# Patient Record
Sex: FEMALE | Race: WHITE | NOT HISPANIC OR LATINO | Employment: OTHER | URBAN - METROPOLITAN AREA
[De-identification: names, ages, dates, MRNs, and addresses within clinical notes are randomized per-mention and may not be internally consistent; named-entity substitution may affect disease eponyms.]

---

## 2017-06-15 ENCOUNTER — OFFICE VISIT (OUTPATIENT)
Dept: FAMILY MEDICINE | Facility: CLINIC | Age: 57
End: 2017-06-15
Payer: COMMERCIAL

## 2017-06-15 VITALS
HEIGHT: 65 IN | OXYGEN SATURATION: 99 % | DIASTOLIC BLOOD PRESSURE: 82 MMHG | TEMPERATURE: 98.9 F | BODY MASS INDEX: 17.99 KG/M2 | WEIGHT: 108 LBS | SYSTOLIC BLOOD PRESSURE: 120 MMHG | HEART RATE: 81 BPM

## 2017-06-15 DIAGNOSIS — N89.8 VAGINAL DRYNESS: ICD-10-CM

## 2017-06-15 DIAGNOSIS — R06.2 WHEEZING: ICD-10-CM

## 2017-06-15 DIAGNOSIS — M26.609 TMJ (TEMPOROMANDIBULAR JOINT SYNDROME): ICD-10-CM

## 2017-06-15 DIAGNOSIS — F51.01 PRIMARY INSOMNIA: ICD-10-CM

## 2017-06-15 DIAGNOSIS — Z91.09 HOUSE DUST MITE ALLERGY: Primary | ICD-10-CM

## 2017-06-15 DIAGNOSIS — J06.9 UPPER RESPIRATORY TRACT INFECTION, UNSPECIFIED TYPE: ICD-10-CM

## 2017-06-15 DIAGNOSIS — J30.89 NON-SEASONAL ALLERGIC RHINITIS DUE TO OTHER ALLERGIC TRIGGER: ICD-10-CM

## 2017-06-15 DIAGNOSIS — H91.93 DECREASED HEARING, BILATERAL: ICD-10-CM

## 2017-06-15 DIAGNOSIS — B00.1 RECURRENT HERPES LABIALIS: ICD-10-CM

## 2017-06-15 PROCEDURE — 99214 OFFICE O/P EST MOD 30 MIN: CPT | Performed by: PHYSICIAN ASSISTANT

## 2017-06-15 RX ORDER — CETIRIZINE HYDROCHLORIDE 10 MG/1
10 TABLET ORAL DAILY
Qty: 90 TABLET | Refills: 11 | Status: SHIPPED | OUTPATIENT
Start: 2017-06-15 | End: 2018-06-19

## 2017-06-15 RX ORDER — VALACYCLOVIR HYDROCHLORIDE 1 G/1
2000 TABLET, FILM COATED ORAL 2 TIMES DAILY
Qty: 20 TABLET | Refills: 6 | Status: SHIPPED | OUTPATIENT
Start: 2017-06-15 | End: 2018-06-19

## 2017-06-15 RX ORDER — FLUTICASONE PROPIONATE 50 MCG
2 SPRAY, SUSPENSION (ML) NASAL DAILY
Qty: 1 BOTTLE | Refills: 11 | Status: SHIPPED | OUTPATIENT
Start: 2017-06-15 | End: 2018-06-19

## 2017-06-15 RX ORDER — AMOXICILLIN 875 MG
875 TABLET ORAL 2 TIMES DAILY
Qty: 20 TABLET | Refills: 0 | Status: SHIPPED | OUTPATIENT
Start: 2017-06-15 | End: 2018-06-19

## 2017-06-15 RX ORDER — ESTRADIOL 0.1 MG/G
2 CREAM VAGINAL
Qty: 42.5 G | Refills: 3 | Status: SHIPPED | OUTPATIENT
Start: 2017-06-15 | End: 2018-06-19

## 2017-06-15 RX ORDER — GUAIFENESIN AND DEXTROMETHORPHAN HYDROBROMIDE 1200; 60 MG/1; MG/1
1 TABLET, EXTENDED RELEASE ORAL 2 TIMES DAILY PRN
Qty: 60 TABLET | Refills: 11 | Status: SHIPPED | OUTPATIENT
Start: 2017-06-15 | End: 2018-06-19

## 2017-06-15 RX ORDER — ZOLPIDEM TARTRATE 5 MG/1
5 TABLET ORAL
Qty: 30 TABLET | Refills: 0 | Status: SHIPPED | OUTPATIENT
Start: 2017-06-15 | End: 2018-06-19

## 2017-06-15 RX ORDER — CYCLOBENZAPRINE HCL 10 MG
10 TABLET ORAL 3 TIMES DAILY PRN
Qty: 30 TABLET | Refills: 3 | Status: SHIPPED | OUTPATIENT
Start: 2017-06-15 | End: 2018-06-19

## 2017-06-15 RX ORDER — ALBUTEROL SULFATE 90 UG/1
2 AEROSOL, METERED RESPIRATORY (INHALATION) EVERY 6 HOURS PRN
Qty: 1 INHALER | Refills: 0 | Status: SHIPPED | OUTPATIENT
Start: 2017-06-15 | End: 2018-06-19

## 2017-06-15 NOTE — PROGRESS NOTES
"  SUBJECTIVE:                                                    Gabriela Feliciano is a 57 year old female who presents to clinic today for the following health issues:      NEW PATIENT TO ME:      ENT Symptoms             Symptoms: cc Present Absent Comment   Fever/Chills   x    Fatigue   x    Muscle Aches   x    Eye Irritation   x    Sneezing   x    Nasal Jose/Drg  x     Sinus Pressure/Pain  x     Loss of smell   x    Dental pain   x    Sore Throat   x    Swollen Glands   x    Ear Pain/Fullness  x  Both ears feels clogged   Cough  x     Wheeze   x    Chest Pain   x    Shortness of breath   x    Rash   x    Other   x      Symptom duration:  x 1-2 weeks   Symptom severity:  mild to moderate   Treatments tried:  none   Contacts:  none       June 1st after cruising had a sore throat and cough.  Has trouble hearing now.  Feels like she is \"in a bubble\". Is concerned if her balance gets worse she will fall, no balance troubles yet she is just worrying about the future she states. No vertigo.  Sometimes productive cough.  Not sure of color. Not sure if she has nasal discharge or not.  Ringing off and on in her ear, mild. High pitched.   Gets seasonal allergies and dust mite allergies.   No pain.   Does have history of TMJ and anxiety.   Temp is normal today. No known fevers.   Has been using neti pot daily.    No h/o asthma or inhaler use.   Has taken aleve D but not in past two days. Helped temporarily but then her ear symptoms just come back.    Already uses flonase daily for allergies and neti pot.     Nonsmoker.       Also would like all her medications refilled today if possible.       Flexeril-uses PRN TMJ, no side effects. Works well.      ambien 5 mg--takes PRN insomnia. No side effects. Is aware of risks. Will get further refills from PCP.       Valtrex-takes as needed for outbreaks.          Estrace-for vaginal dryness. Works. No side effects.              Mood has been okay, Denies suicidal or homicidal " thoughts.  Patient instructed to go to the emergency room or call 911 if these occur.        Problem list and histories reviewed & adjusted, as indicated.  Additional history: as documented    Patient Active Problem List   Diagnosis     House dust mite allergy     GERD (gastroesophageal reflux disease)     TMJ (temporomandibular joint syndrome)     Diagnostic skin and sensitization tests     Recurrent herpes labialis     Hyperlipidemia LDL goal <160     Post-menopausal     Family history of breast cancer in first degree relative     Generalized anxiety disorder     Advanced directives, counseling/discussion     10 year risk of MI or stroke < 7.5%     Past Surgical History:   Procedure Laterality Date     NO HISTORY OF SURGERY         Social History   Substance Use Topics     Smoking status: Never Smoker     Smokeless tobacco: Never Used     Alcohol use Yes      Comment: wine every 1 weeks     Family History   Problem Relation Age of Onset     Hypertension Mother      CEREBROVASCULAR DISEASE Mother      HEART DISEASE Mother       of MI age 78     Unknown/Adopted Maternal Grandmother      Circulatory Maternal Grandfather      hrafening of arteries/leg amputated     Unknown/Adopted Paternal Grandmother      Unknown/Adopted Paternal Grandfather      Obesity Brother      CEREBROVASCULAR DISEASE Maternal Aunt      CEREBROVASCULAR DISEASE Maternal Aunt      Breast Cancer Maternal Aunt          Current Outpatient Prescriptions   Medication Sig Dispense Refill     fluticasone (FLONASE) 50 MCG/ACT spray Spray 2 sprays into both nostrils daily 1 Bottle 11     cetirizine (ZYRTEC) 10 MG tablet Take 1 tablet (10 mg) by mouth daily 90 tablet 11     cyclobenzaprine (FLEXERIL) 10 MG tablet Take 1 tablet (10 mg) by mouth 3 times daily as needed for muscle spasms 30 tablet 3     zolpidem (AMBIEN) 5 MG tablet Take 1 tablet (5 mg) by mouth nightly as needed for sleep 30 tablet 0     valACYclovir (VALTREX) 1000 mg tablet Take 2  "tablets (2,000 mg) by mouth 2 times daily 20 tablet 6     estradiol (ESTRACE) 0.1 MG/GM cream Place 2 g vaginally twice a week 42.5 g 3     albuterol (PROAIR HFA/PROVENTIL HFA/VENTOLIN HFA) 108 (90 BASE) MCG/ACT Inhaler Inhale 2 puffs into the lungs every 6 hours as needed for shortness of breath / dyspnea or wheezing 1 Inhaler 0     amoxicillin (AMOXIL) 875 MG tablet Take 1 tablet (875 mg) by mouth 2 times daily With food. 20 tablet 0     Dextromethorphan-Guaifenesin  MG TB12 Take 1 tablet by mouth 2 times daily as needed 60 tablet 11     calcium carbonate-vitamin D 600-200 MG-UNIT TABS Take 1 tablet by mouth daily 90 tablet 3     Hypertonic Nasal Wash (NASAFLO NETI POT NASAL WASH NA) [place] 1 Bottle in nostril daily as needed.       CITRACAL + D PO 1 tablet daily       [DISCONTINUED] valACYclovir (VALTREX) 1000 mg tablet Take 2 tablets (2,000 mg) by mouth 2 times daily 20 tablet 6     Allergies   Allergen Reactions     Nkda [No Known Drug Allergies]        ROS:  Constitutional, HEENT, cardiovascular, pulmonary, GI, , musculoskeletal, neuro, skin, endocrine and psych systems are negative, except as otherwise noted.    OBJECTIVE:                                                    /82  Pulse 81  Temp 98.9  F (37.2  C) (Oral)  Ht 5' 5\" (1.651 m)  Wt 108 lb (49 kg)  SpO2 99%  Breastfeeding? No  BMI 17.97 kg/m2  Body mass index is 17.97 kg/(m^2).  GENERAL:  No acute distress.  Interacts appropriately.  Breathing without difficulty.  Alert.  HEENT:  Tympanic membranes intact, no erythema, bilateral dullness ? Effusion possible. Normal  light reflex. Oral mucosa moist. Posterior pharynx has no erythema.  Posterior pharynx has no exudate. Without edema.  NECK:  Soft and supple.  without tenderness.  Without lymphadenopathy.  Normal range of motion.    CARDIAC:   Regular rate and rhythm.  No murmurs, rubs, or gallops.   PULMONARY: mild expiratory wheezing throughout, no crackles or rhonchi.   Normal air " exchange/breath sounds.  No use of accessory muscles.    PSYCH: moderately anxious appearing  SKIN: No rashes.         ASSESSMENT/PLAN:                                                    ASSESSMENT / PLAN:  (Z91.09) House dust mite allergy  (primary encounter diagnosis)  Comment:   Plan: fluticasone (FLONASE) 50 MCG/ACT spray            (M26.609) TMJ (temporomandibular joint syndrome)  Comment:   Plan: cyclobenzaprine (FLEXERIL) 10 MG tablet            (F51.01) Primary insomnia  Comment:   Plan: zolpidem (AMBIEN) 5 MG tablet        Recheck with pcp when needing refills, given 1 month today    (B00.1) Recurrent herpes labialis  Comment:   Plan: valACYclovir (VALTREX) 1000 mg tablet            (N89.8) Vaginal dryness  Comment:   Plan: cetirizine (ZYRTEC) 10 MG tablet, estradiol         (ESTRACE) 0.1 MG/GM cream            (R06.2) Wheezing  Comment:   Plan: albuterol (PROAIR HFA/PROVENTIL HFA/VENTOLIN         HFA) 108 (90 BASE) MCG/ACT Inhaler            Recheck if not improving in 2 weeks      (J06.9) Upper respiratory tract infection, unspecified type  Comment:   Plan: discussed likely viral but patient is requesting antibiotics.  Seems reasonable as she is worsening. She is aware of risks.      (H91.93) Decreased hearing, bilateral  Comment:   Plan: amoxicillin (AMOXIL) 875 MG tablet  If hearing persists for more than 6 weeks total, will refer to audiology.  Most likely due to URI.     (J30.89) Non-seasonal allergic rhinitis due to other allergic trigger  Comment:   Plan: Dextromethorphan-Guaifenesin  MG TB12            Patient Instructions   Take antibiotic with food    Take with food. Side effects discussed.  Call with worsening symptoms or if no improvement in 5 days.  Analgesics for pain with food as needed.        Jannette Nur PA-C  [facility]

## 2017-06-15 NOTE — NURSING NOTE
"Chief Complaint   Patient presents with     URI     upper resp issue per pt x 1-2 weeks now      Ear Problem     both ears feels clogged       Initial BP (!) 144/92  Pulse 81  Temp 98.9  F (37.2  C) (Oral)  Ht 5' 5\" (1.651 m)  Wt 108 lb (49 kg)  SpO2 99%  Breastfeeding? No  BMI 17.97 kg/m2 Estimated body mass index is 17.97 kg/(m^2) as calculated from the following:    Height as of this encounter: 5' 5\" (1.651 m).    Weight as of this encounter: 108 lb (49 kg).  Medication Reconciliation: complete      Kaleigh Farris MA    "

## 2017-06-15 NOTE — MR AVS SNAPSHOT
After Visit Summary   6/15/2017    Gabriela Feliciano    MRN: [id number]           Patient Information     Date Of Birth          1960        Visit Information        Provider Department      6/15/2017 10:40 AM Jannette Nur PA-C [facility]        Today's Diagnoses     House dust mite allergy    -  1    TMJ (temporomandibular joint syndrome)        Primary insomnia        Recurrent herpes labialis        Vaginal dryness        Wheezing        Upper respiratory tract infection, unspecified type        Decreased hearing, bilateral          Care Instructions    Take antibiotic with food          Follow-ups after your visit        Who to contact     If you have questions or need follow up information about today's clinic visit or your schedule please contact [facility] directly at [phone].  Normal or non-critical lab and imaging results will be communicated to you by [employer], letter or phone within 4 business days after the clinic has received the results. If you do not hear from us within 7 days, please contact the clinic through [employer] or phone. If you have a critical or abnormal lab result, we will notify you by phone as soon as possible.  Submit refill requests through "[employer]" or call your pharmacy and they will forward the refill request to us. Please allow 3 business days for your refill to be completed.          Additional Information About Your Visit        [employer] Information     "[employer] gives you secure access to your electronic health record. If you see a primary care provider, you can also send messages to your care team and make appointments. If you have questions, please call your primary care clinic.  If you do not have a primary care provider, please call [phone] and they will assist you.        Care EveryWhere ID     This is your Care EveryWhere ID. This could be used by other organizations to access your [facility]  "records  [id number]        Your Vitals Were     Pulse Temperature Height Pulse Oximetry Breastfeeding? BMI (Body Mass Index)    81 98.9  F (37.2  C) (Oral) 5' 5\" (1.651 m) 99% No 17.97 kg/m2       Blood Pressure from Last 3 Encounters:   06/15/17 (!) 144/92   07/21/16 120/78   04/22/15 125/68    Weight from Last 3 Encounters:   06/15/17 108 lb (49 kg)   07/21/16 106 lb (48.1 kg)   04/22/15 105 lb (47.6 kg)              Today, you had the following     No orders found for display         Today's Medication Changes          These changes are accurate as of: 6/15/17 11:18 AM.  If you have any questions, ask your nurse or doctor.               Start taking these medicines.        Dose/Directions    albuterol 108 (90 BASE) MCG/ACT Inhaler   Commonly known as:  PROAIR HFA/PROVENTIL HFA/VENTOLIN HFA   Used for:  Wheezing   Started by:  Jannette Nur PA-C        Dose:  2 puff   Inhale 2 puffs into the lungs every 6 hours as needed for shortness of breath / dyspnea or wheezing   Quantity:  1 Inhaler   Refills:  0       amoxicillin 875 MG tablet   Commonly known as:  AMOXIL   Used for:  Decreased hearing, bilateral   Started by:  Janntete Nur PA-C        Dose:  875 mg   Take 1 tablet (875 mg) by mouth 2 times daily With food.   Quantity:  20 tablet   Refills:  0            Where to get your medicines      These medications were sent to [facility] [address] [place], [place] [address]on [facility] [address]  [address] [address]     Phone:  [phone]     albuterol 108 (90 BASE) MCG/ACT Inhaler    amoxicillin 875 MG tablet         These medications were sent to [id number] - [address] [facility] AT [address] [place], [place] [address]     Phone:  [phone]     cyclobenzaprine 10 MG tablet    estradiol 0.1 MG/GM cream    valACYclovir 1000 mg tablet         Some of these will need a paper " prescription and others can be bought over the counter.  Ask your nurse if you have questions.     Bring a paper prescription for each of these medications     cetirizine 10 MG tablet    fluticasone 50 MCG/ACT spray    zolpidem 5 MG tablet                Primary Care Provider Office Phone # Fax #    Vanessa Costello [phone]       [facility] [address] [facility] [address]        Thank you!     Thank you for choosing [facility]  for your care. Our goal is always to provide you with excellent care. Hearing back from our patients is one way we can continue to improve our services. Please take a few minutes to complete the written survey that you may receive in the mail after your visit with us. Thank you!             Your Updated Medication List - Protect others around you: Learn how to safely use, store and throw away your medicines at www.disposemymeds.org.          This list is accurate as of: 6/15/17 11:18 AM.  Always use your most recent med list.                   Brand Name Dispense Instructions for use    albuterol 108 (90 BASE) MCG/ACT Inhaler    PROAIR HFA/PROVENTIL HFA/VENTOLIN HFA    1 Inhaler    Inhale 2 puffs into the lungs every 6 hours as needed for shortness of breath / dyspnea or wheezing       amoxicillin 875 MG tablet    AMOXIL    20 tablet    Take 1 tablet (875 mg) by mouth 2 times daily With food.       calcium carbonate-vitamin D 600-200 MG-UNIT Tabs     90 tablet    Take 1 tablet by mouth daily       cetirizine 10 MG tablet    zyrTEC    90 tablet    Take 1 tablet (10 mg) by mouth daily       CITRACAL + D PO      1 tablet daily       cyclobenzaprine 10 MG tablet    FLEXERIL    30 tablet    Take 1 tablet (10 mg) by mouth 3 times daily as needed for muscle spasms       Dextromethorphan-Guaifenesin  MG Tb12     60 tablet    Take 1 tablet by mouth 2 times daily as needed       estradiol 0.1 MG/GM cream    ESTRACE    42.5 g    Place 2 g  vaginally twice a week       fluticasone 50 MCG/ACT spray    FLONASE    1 Bottle    Spray 2 sprays into both nostrils daily       LORazepam 0.5 MG tablet    ATIVAN    4 tablet    Take 0.5-1 tablets by mouth every 8 hours as needed for anxiety. Take 30 minutes prior to departure.  Do not operate a vehicle after taking this medication       NASAFLO NETI POT NASAL WASH NA      [place] 1 Bottle in nostril daily as needed.       valACYclovir 1000 mg tablet    VALTREX    20 tablet    Take 2 tablets (2,000 mg) by mouth 2 times daily       zolpidem 5 MG tablet    AMBIEN    30 tablet    Take 1 tablet (5 mg) by mouth nightly as needed for sleep

## 2017-08-18 ENCOUNTER — MYC MEDICAL ADVICE (OUTPATIENT)
Dept: FAMILY MEDICINE | Facility: CLINIC | Age: 57
End: 2017-08-18

## 2017-08-18 DIAGNOSIS — R06.2 WHEEZING: ICD-10-CM

## 2017-08-18 RX ORDER — ALBUTEROL SULFATE 90 UG/1
2 AEROSOL, METERED RESPIRATORY (INHALATION) EVERY 6 HOURS PRN
Qty: 1 INHALER | Refills: 0 | OUTPATIENT
Start: 2017-08-18

## 2017-08-18 NOTE — TELEPHONE ENCOUNTER
If this was helpful she needs evaluation for asthma/copd as this should not be used on regular basis without a diagnosis.

## 2017-08-18 NOTE — TELEPHONE ENCOUNTER
To another provider to advise. Patient seen 6/15/17, given prescription for wheezing. No hx of asthma.    Wendy ORTIZN, RN, CPN

## 2017-09-07 ENCOUNTER — TELEPHONE (OUTPATIENT)
Dept: FAMILY MEDICINE | Facility: CLINIC | Age: 57
End: 2017-09-07

## 2017-09-08 NOTE — TELEPHONE ENCOUNTER
"Appt notes on 4/22/15 and 7/21/16 states for mammo \"Patient over age 50, mutual decision to screen reflected in health maintenance,\" noted declined in  on these dates. Did not place call. Add to no call list? NX   "

## 2017-12-15 ENCOUNTER — TELEPHONE (OUTPATIENT)
Dept: FAMILY MEDICINE | Facility: CLINIC | Age: 57
End: 2017-12-15

## 2017-12-15 NOTE — LETTER
Gabriela Feliciano  [address] [facility] [address]      January 10, 2018      Dear Gabriela,      Our records indicate that you have not scheduled for a(n)Mammogram which was recommended by your health care team. Monitoring and managing your preventative and chronic health conditions are very important to us.       If you have received your health care elsewhere, please provide us with that information so it can be documented in your chart.    Please call [phone] or message us through your [employer] account to schedule an appointment or provide information for your chart.     We look forward to seeing you and working with you on your health care needs.     Sincerely,   Vanessa Costello MD/          *If you have already scheduled an appointment, please disregard this reminder

## 2017-12-15 NOTE — TELEPHONE ENCOUNTER
12/15/2017    Call Regarding Preventive Health Screening Colonoscopy and Mammogram AND PHYSICAL     Attempt 1    Message on voicemail    Comments:       Outreach   Delores Zhu

## 2018-01-09 NOTE — TELEPHONE ENCOUNTER
1/9/2018    Call Regarding Preventive Health Screening Colonoscopy and fit test     Attempt 2    Message on voicemail    Comments:       Outreach   Juana Wall

## 2018-02-01 NOTE — TELEPHONE ENCOUNTER
2/1/2018    Call Regarding Preventive Health Screening Colonoscopy    Attempt 3    Message on voicemail    Comments:       Outreach   Delores Zhu

## 2018-04-16 ENCOUNTER — TRANSFERRED RECORDS (OUTPATIENT)
Dept: HEALTH INFORMATION MANAGEMENT | Facility: CLINIC | Age: 58
End: 2018-04-16

## 2018-05-31 NOTE — PATIENT INSTRUCTIONS
Never mix ambien and ativan  Take ativan prior to mammogram and have     Call elizabeth lyman to schedule breast imaging.  Call .        Preventive Health Recommendations  Female Ages 50 - 64    Yearly exam: See your health care provider every year in order to  o Review health changes.   o Discuss preventive care.    o Review your medicines if your doctor has prescribed any.      Get a Pap test every three years (unless you have an abnormal result and your provider advises testing more often).    If you get Pap tests with HPV test, you only need to test every 5 years, unless you have an abnormal result.     You do not need a Pap test if your uterus was removed (hysterectomy) and you have not had cancer.    You should be tested each year for STDs (sexually transmitted diseases) if you're at risk.     Have a mammogram every 1 to 2 years.    Have a colonoscopy at age 50, or have a yearly FIT test (stool test). These exams screen for colon cancer.      Have a cholesterol test every 5 years, or more often if advised.    Have a diabetes test (fasting glucose) every three years. If you are at risk for diabetes, you should have this test more often.     If you are at risk for osteoporosis (brittle bone disease), think about having a bone density scan (DEXA).    Shots: Get a flu shot each year. Get a tetanus shot every 10 years.    Nutrition:     Eat at least 5 servings of fruits and vegetables each day.    Eat whole-grain bread, whole-wheat pasta and brown rice instead of white grains and rice.    Talk to your provider about Calcium and Vitamin D.     Lifestyle    Exercise at least 150 minutes a week (30 minutes a day, 5 days a week). This will help you control your weight and prevent disease.    Limit alcohol to one drink per day.    No smoking.     Wear sunscreen to prevent skin cancer.     See your dentist every six months for an exam and cleaning.    See your eye doctor every 1 to 2 years.

## 2018-05-31 NOTE — PROGRESS NOTES
SUBJECTIVE:   CC: Gabriela Feliciano is an 57 year old woman who presents for preventive health visit.     Healthy Habits:    Answers for HPI/ROS submitted by the patient on 6/19/2018   Annual Exam:  Getting at least 3 servings of Calcium per day:: Yes  Bi-annual eye exam:: Yes  Dental care twice a year:: Yes  Sleep apnea or symptoms of sleep apnea:: None  Diet:: Low fat/cholesterol, Other  Frequency of exercise:: 4-5 days/week  Taking medications regularly:: Yes  Medication side effects:: None  Additional concerns today:: No  PHQ-2 Score: 0  Duration of exercise:: 15-30 minutes    Meds refill.  Takes ambien very sparingly fills less than once a year (whole bottle).  No side effects.     Flexeril PRN TMJ. Works well no side effects.     Zyrtec and flonase for allergies.     Calcium and vitamin D for osteoporosis-saw endocrine through [facility]. Has records in computer.     mammo-she is worried about this.     Due for colonoscopy. Agrees to fit.   Not due for pap.   Fasting labs-already had done. Cholesterol somewhat high but to goal for her.   Glucose normal.      Mammogram-has anxiety as she gets really anxious with these, would like ativan to take before (needed this previously). Last mammo was 5 years ago.     Had DEXA---was told osteoporosis. Broke wrist on 12-20-17.  Is on forteo for this.     The 10-year ASCVD risk score (Wandabhavana MCGHEE Jr, et al., 2013) is: 3.1%    Values used to calculate the score:      Age: 58 years      Sex: Female      Is Non- : No      Diabetic: No      Tobacco smoker: No      Systolic Blood Pressure: 130 mmHg      Is BP treated: No      HDL Cholesterol: 66 mg/dL      Total Cholesterol: 237 mg/dL          Today's PHQ-2 Score:   PHQ-2 ( 1999 Pfizer) 6/19/2018 6/15/2017   Q1: Little interest or pleasure in doing things 0 0   Q2: Feeling down, depressed or hopeless 0 0   PHQ-2 Score 0 0   Q1: Little interest or pleasure in doing things Not at all -   Q2: Feeling down,  depressed or hopeless Not at all -   PHQ-2 Score 0 -       Abuse: Current or Past(Physical, Sexual or Emotional)- No  Do you feel safe in your environment - Yes    Social History   Substance Use Topics     Smoking status: Never Smoker     Smokeless tobacco: Never Used     Alcohol use Yes      Comment: wine every 1 weeks     If you drink alcohol do you typically have >3 drinks per day or >7 drinks per week? No                     Reviewed orders with patient.  Reviewed health maintenance and updated orders accordingly - Yes  BP Readings from Last 3 Encounters:   18 130/62   06/15/17 120/82   16 120/78    Wt Readings from Last 3 Encounters:   18 112 lb (50.8 kg)   06/15/17 108 lb (49 kg)   16 106 lb (48.1 kg)                  Patient Active Problem List   Diagnosis     House dust mite allergy     GERD (gastroesophageal reflux disease)     TMJ (temporomandibular joint syndrome)     Diagnostic skin and sensitization tests     Recurrent herpes labialis     Hyperlipidemia LDL goal <160     Post-menopausal     Family history of breast cancer in first degree relative     Generalized anxiety disorder     Advanced directives, counseling/discussion     10 year risk of MI or stroke < 7.5%     Osteoporosis     Past Surgical History:   Procedure Laterality Date     NO HISTORY OF SURGERY         Social History   Substance Use Topics     Smoking status: Never Smoker     Smokeless tobacco: Never Used     Alcohol use Yes      Comment: wine every 1 weeks     Family History   Problem Relation Age of Onset     Hypertension Mother      Cerebrovascular Disease Mother      HEART DISEASE Mother       of MI age 78     Unknown/Adopted Maternal Grandmother      Circulatory Maternal Grandfather      hrafening of arteries/leg amputated     Unknown/Adopted Paternal Grandmother      Unknown/Adopted Paternal Grandfather      Obesity Brother      Cerebrovascular Disease Maternal Aunt      Cerebrovascular Disease Maternal  Aunt      Breast Cancer Maternal Aunt          Current Outpatient Prescriptions   Medication Sig Dispense Refill     calcium carbonate-vitamin D 600-200 MG-UNIT TABS Take 1 tablet by mouth daily 30 tablet 11     cetirizine (ZYRTEC) 10 MG tablet Take 1 tablet (10 mg) by mouth daily 90 tablet 11     CITRACAL + D PO 1 tablet daily       cyclobenzaprine (FLEXERIL) 10 MG tablet Take 1 tablet (10 mg) by mouth 3 times daily as needed for muscle spasms 30 tablet 3     Dextromethorphan-Guaifenesin  MG TB12 Take 1 tablet by mouth 2 times daily as needed 60 tablet 11     fluticasone (FLONASE) 50 MCG/ACT spray Spray 2 sprays into both nostrils daily 1 Bottle 11     LORazepam (ATIVAN) 0.5 MG tablet Take 0.5-1 tablets (0.25-0.5 mg) by mouth every 8 hours as needed for anxiety Take 30 minutes prior to departure.  Do not operate a vehicle after taking this medication 2 tablet 0     teriparatide, recombinant, (FORTEO) 600 MCG/2.4ML SOLN injection Inject 20 mcg Subcutaneous       teriparatide, recombinant, (FORTEO) 600 MCG/2.4ML SOLN injection Inject 0.08 mLs (20 mcg) Subcutaneous daily 2.4 mL 11     valACYclovir (VALTREX) 1000 mg tablet Take 2 tablets (2,000 mg) by mouth 2 times daily 20 tablet 6     zolpidem (AMBIEN) 5 MG tablet Take 1 tablet (5 mg) by mouth nightly as needed for sleep 30 tablet 0     [DISCONTINUED] valACYclovir (VALTREX) 1000 mg tablet Take 2 tablets (2,000 mg) by mouth 2 times daily 20 tablet 6       Patient over age 50, mutual decision to screen reflected in health maintenance.    Pertinent mammograms are reviewed under the imaging tab.  History of abnormal Pap smear: NO - age 30- 65 PAP every 3 years recommended    Reviewed and updated as needed this visit by clinical staff  Tobacco  Allergies  Meds  Med Hx  Surg Hx  Fam Hx  Soc Hx        Reviewed and updated as needed this visit by Provider        Past Medical History:   Diagnosis Date     Diagnostic skin and sensitization tests 3/94 skin tests  "pos. for DM only--all other environemental allergens NEGATIVE     Dyslipidemia      GERD (gastroesophageal reflux disease)      House dust mite allergy 3/94 skin tests    only DM     Post menopausal problems      TMJ disorder      Vitamin D deficiency       Past Surgical History:   Procedure Laterality Date     NO HISTORY OF SURGERY       Obstetric History       T2      L2     SAB0   TAB0   Ectopic0   Multiple0   Live Births2       # Outcome Date GA Lbr Santos/2nd Weight Sex Delivery Anes PTL Lv   2 Term         KASSIDY   1 Term         KASSIDY          ROS:  CONSTITUTIONAL: NEGATIVE for fever, chills, change in weight  INTEGUMENTARY/SKIN: NEGATIVE for worrisome rashes, moles or lesions  EYES: NEGATIVE for vision changes or irritation  ENT: NEGATIVE for ear, mouth and throat problems  RESP: NEGATIVE for significant cough or SOB  BREAST: NEGATIVE for masses, tenderness or discharge  CV: NEGATIVE for chest pain, palpitations or peripheral edema  GI: NEGATIVE for nausea, abdominal pain, heartburn, or change in bowel habits  : NEGATIVE for unusual urinary or vaginal symptoms. No vaginal bleeding.  MUSCULOSKELETAL: NEGATIVE for significant arthralgias or myalgia  NEURO: NEGATIVE for weakness, dizziness or paresthesias  PSYCHIATRIC: NEGATIVE for changes in mood or affect     OBJECTIVE:   /62  Pulse 68  Temp 97.1  F (36.2  C) (Oral)  Resp 14  Ht 5' 5\" (1.651 m)  Wt 112 lb (50.8 kg)  SpO2 96%  Breastfeeding? No  BMI 18.64 kg/m2  EXAM:  GENERAL: healthy, alert and no distress  EYES: Eyes grossly normal to inspection, PERRL and conjunctivae and sclerae normal  HENT: ear canals and TM's normal, nose and mouth without ulcers or lesions  NECK: no adenopathy, no asymmetry, masses, or scars and thyroid normal to palpation  RESP: lungs clear to auscultation - no rales, rhonchi or wheezes  BREAST: normal without masses, tenderness or nipple discharge and no palpable axillary masses or adenopathy  CV: " regular rate and rhythm, normal S1 S2, no S3 or S4, no murmur, click or rub, no peripheral edema and peripheral pulses strong  ABDOMEN: soft, nontender, no hepatosplenomegaly, no masses and bowel sounds normal  MS: no gross musculoskeletal defects noted, no edema  SKIN: no suspicious lesions or rashes  NEURO: Normal strength and tone, mentation intact and speech normal  PSYCH: mentation appears normal, affect normal/bright    ASSESSMENT/PLAN:   1. Encounter for routine adult health examination with abnormal findings      2. Screen for colon cancer    - Fecal colorectal cancer screen FIT - Future (S+30); Future    3. Vaginal dryness    - cetirizine (ZYRTEC) 10 MG tablet; Take 1 tablet (10 mg) by mouth daily  Dispense: 90 tablet; Refill: 11    4. TMJ (temporomandibular joint syndrome)    - cyclobenzaprine (FLEXERIL) 10 MG tablet; Take 1 tablet (10 mg) by mouth 3 times daily as needed for muscle spasms  Dispense: 30 tablet; Refill: 3    5. House dust mite allergy    - fluticasone (FLONASE) 50 MCG/ACT spray; Spray 2 sprays into both nostrils daily  Dispense: 1 Bottle; Refill: 11    6. Recurrent herpes labialis    - valACYclovir (VALTREX) 1000 mg tablet; Take 2 tablets (2,000 mg) by mouth 2 times daily  Dispense: 20 tablet; Refill: 6    7. Primary insomnia    - zolpidem (AMBIEN) 5 MG tablet; Take 1 tablet (5 mg) by mouth nightly as needed for sleep  Dispense: 30 tablet; Refill: 0    8. Routine general medical examination at a health care facility    - *MA Screening Digital Bilateral; Future  - calcium carbonate-vitamin D 600-200 MG-UNIT TABS; Take 1 tablet by mouth daily  Dispense: 30 tablet; Refill: 11    9. Anxiety    - LORazepam (ATIVAN) 0.5 MG tablet; Take 0.5-1 tablets (0.25-0.5 mg) by mouth every 8 hours as needed for anxiety Take 30 minutes prior to departure.  Do not operate a vehicle after taking this medication  Dispense: 2 tablet; Refill: 0    COUNSELING:   Reviewed preventive health counseling, as reflected  "in patient instructions       Regular exercise       Healthy diet/nutrition         reports that she has never smoked. She has never used smokeless tobacco.    Estimated body mass index is 18.64 kg/(m^2) as calculated from the following:    Height as of this encounter: 5' 5\" (1.651 m).    Weight as of this encounter: 112 lb (50.8 kg).     Patient Instructions   Never mix ambien and ativan  Take ativan prior to mammogram and have     Call elizabeth lyman to schedule breast imaging.  Call .        Preventive Health Recommendations  Female Ages 50 - 64    Yearly exam: See your health care provider every year in order to  o Review health changes.   o Discuss preventive care.    o Review your medicines if your doctor has prescribed any.      Get a Pap test every three years (unless you have an abnormal result and your provider advises testing more often).    If you get Pap tests with HPV test, you only need to test every 5 years, unless you have an abnormal result.     You do not need a Pap test if your uterus was removed (hysterectomy) and you have not had cancer.    You should be tested each year for STDs (sexually transmitted diseases) if you're at risk.     Have a mammogram every 1 to 2 years.    Have a colonoscopy at age 50, or have a yearly FIT test (stool test). These exams screen for colon cancer.      Have a cholesterol test every 5 years, or more often if advised.    Have a diabetes test (fasting glucose) every three years. If you are at risk for diabetes, you should have this test more often.     If you are at risk for osteoporosis (brittle bone disease), think about having a bone density scan (DEXA).    Shots: Get a flu shot each year. Get a tetanus shot every 10 years.    Nutrition:     Eat at least 5 servings of fruits and vegetables each day.    Eat whole-grain bread, whole-wheat pasta and brown rice instead of white grains and rice.    Talk to your provider about Calcium and " Vitamin D.     Lifestyle    Exercise at least 150 minutes a week (30 minutes a day, 5 days a week). This will help you control your weight and prevent disease.    Limit alcohol to one drink per day.    No smoking.     Wear sunscreen to prevent skin cancer.     See your dentist every six months for an exam and cleaning.    See your eye doctor every 1 to 2 years.            Counseling Resources:  ATP IV Guidelines  Pooled Cohorts Equation Calculator  Breast Cancer Risk Calculator  FRAX Risk Assessment  ICSI Preventive Guidelines  Dietary Guidelines for Americans, 2010  USDA's MyPlate  ASA Prophylaxis  Lung CA Screening    Jannette Nur PA-C  [facility]

## 2018-06-04 ENCOUNTER — DOCUMENTATION ONLY (OUTPATIENT)
Dept: LAB | Facility: CLINIC | Age: 58
End: 2018-06-04

## 2018-06-04 DIAGNOSIS — Z13.1 SCREENING FOR DIABETES MELLITUS: ICD-10-CM

## 2018-06-04 DIAGNOSIS — E78.5 HYPERLIPIDEMIA LDL GOAL <160: Primary | ICD-10-CM

## 2018-06-04 DIAGNOSIS — Z00.00 ROUTINE GENERAL MEDICAL EXAMINATION AT A HEALTH CARE FACILITY: ICD-10-CM

## 2018-06-04 NOTE — PROGRESS NOTES
.Please place or confirm pending lab orders for upcoming lab appointment on 6/12/18  Thank you,  Alla

## 2018-06-09 ENCOUNTER — MYC REFILL (OUTPATIENT)
Dept: FAMILY MEDICINE | Facility: CLINIC | Age: 58
End: 2018-06-09

## 2018-06-09 DIAGNOSIS — Z00.00 ROUTINE GENERAL MEDICAL EXAMINATION AT A HEALTH CARE FACILITY: ICD-10-CM

## 2018-06-11 NOTE — TELEPHONE ENCOUNTER
Patient has lab appointment 6/12 and appointment with Jannette Nur PA-C for physical 6/19    Wendy ORTIZN, RN, CPN

## 2018-06-11 NOTE — TELEPHONE ENCOUNTER
Message from [facility]:  Original authorizing provider: MD Gabriela Ontiveros would like a refill of the following medications:  calcium carbonate-vitamin D 600-200 MG-UNIT TABS [Vanessa Oh MD]    Preferred pharmacy: [facility] [id number] [place] [address] [address] [facility]    Comment:  I would like these renewals in conjunction with my upcoming physical exam in June 2018, please. Thank you, Gabriela Feliciano    Medication renewals requested in this message routed to other providers:  fluticasone (FLONASE) 50 MCG/ACT spray [Jannette Nur PA-C]  cetirizine (ZYRTEC) 10 MG tablet [Jannette Nur PA-C]  cyclobenzaprine (FLEXERIL) 10 MG tablet [Jannette Nur PA-C]  zolpidem (AMBIEN) 5 MG tablet [Jannette Nur PA-C]  Dextromethorphan-Guaifenesin  MG TB12 [Jannette Nur PA-C]

## 2018-06-12 DIAGNOSIS — Z13.1 SCREENING FOR DIABETES MELLITUS: ICD-10-CM

## 2018-06-12 DIAGNOSIS — E78.5 HYPERLIPIDEMIA LDL GOAL <160: ICD-10-CM

## 2018-06-12 DIAGNOSIS — Z00.00 ROUTINE GENERAL MEDICAL EXAMINATION AT A HEALTH CARE FACILITY: ICD-10-CM

## 2018-06-12 LAB
ANION GAP SERPL CALCULATED.3IONS-SCNC: 8 MMOL/L (ref 3–14)
BUN SERPL-MCNC: 15 MG/DL (ref 7–30)
CALCIUM SERPL-MCNC: 9.6 MG/DL (ref 8.5–10.1)
CHLORIDE SERPL-SCNC: 101 MMOL/L (ref 94–109)
CHOLEST SERPL-MCNC: 237 MG/DL
CO2 SERPL-SCNC: 29 MMOL/L (ref 20–32)
CREAT SERPL-MCNC: 0.69 MG/DL (ref 0.52–1.04)
GFR SERPL CREATININE-BSD FRML MDRD: 87 ML/MIN/1.7M2
GLUCOSE SERPL-MCNC: 96 MG/DL (ref 70–99)
HDLC SERPL-MCNC: 66 MG/DL
LDLC SERPL CALC-MCNC: 154 MG/DL
NONHDLC SERPL-MCNC: 171 MG/DL
POTASSIUM SERPL-SCNC: 3.5 MMOL/L (ref 3.4–5.3)
SODIUM SERPL-SCNC: 138 MMOL/L (ref 133–144)
TRIGL SERPL-MCNC: 87 MG/DL

## 2018-06-12 PROCEDURE — 36415 COLL VENOUS BLD VENIPUNCTURE: CPT | Performed by: PHYSICIAN ASSISTANT

## 2018-06-12 PROCEDURE — 80048 BASIC METABOLIC PNL TOTAL CA: CPT | Performed by: PHYSICIAN ASSISTANT

## 2018-06-12 PROCEDURE — 80061 LIPID PANEL: CPT | Performed by: PHYSICIAN ASSISTANT

## 2018-06-12 NOTE — PROGRESS NOTES
Marielos Ochoa,       Your recent test results are attached, if you have any questions or concerns please feel free to contact me via e-mail or call [phone]. We will discuss at upcoming office visit. Labs stable.     Sincerely,  Jannette Nur PA-C

## 2018-06-19 ENCOUNTER — OFFICE VISIT (OUTPATIENT)
Dept: FAMILY MEDICINE | Facility: CLINIC | Age: 58
End: 2018-06-19
Payer: COMMERCIAL

## 2018-06-19 VITALS
RESPIRATION RATE: 14 BRPM | SYSTOLIC BLOOD PRESSURE: 130 MMHG | WEIGHT: 112 LBS | DIASTOLIC BLOOD PRESSURE: 62 MMHG | OXYGEN SATURATION: 96 % | BODY MASS INDEX: 18.66 KG/M2 | HEIGHT: 65 IN | TEMPERATURE: 97.1 F | HEART RATE: 68 BPM

## 2018-06-19 DIAGNOSIS — F41.9 ANXIETY: ICD-10-CM

## 2018-06-19 DIAGNOSIS — Z00.01 ENCOUNTER FOR ROUTINE ADULT HEALTH EXAMINATION WITH ABNORMAL FINDINGS: Primary | ICD-10-CM

## 2018-06-19 DIAGNOSIS — Z91.09 HOUSE DUST MITE ALLERGY: ICD-10-CM

## 2018-06-19 DIAGNOSIS — Z12.11 SCREEN FOR COLON CANCER: ICD-10-CM

## 2018-06-19 DIAGNOSIS — Z00.00 ROUTINE GENERAL MEDICAL EXAMINATION AT A HEALTH CARE FACILITY: ICD-10-CM

## 2018-06-19 DIAGNOSIS — B00.1 RECURRENT HERPES LABIALIS: ICD-10-CM

## 2018-06-19 DIAGNOSIS — M26.609 TMJ (TEMPOROMANDIBULAR JOINT SYNDROME): ICD-10-CM

## 2018-06-19 DIAGNOSIS — F51.01 PRIMARY INSOMNIA: ICD-10-CM

## 2018-06-19 PROBLEM — M81.0 OSTEOPOROSIS: Status: ACTIVE | Noted: 2018-06-19

## 2018-06-19 PROCEDURE — 99396 PREV VISIT EST AGE 40-64: CPT | Performed by: PHYSICIAN ASSISTANT

## 2018-06-19 RX ORDER — CYCLOBENZAPRINE HCL 10 MG
10 TABLET ORAL 3 TIMES DAILY PRN
Qty: 30 TABLET | Refills: 3 | Status: SHIPPED | OUTPATIENT
Start: 2018-06-19 | End: 2019-07-26

## 2018-06-19 RX ORDER — CETIRIZINE HYDROCHLORIDE 10 MG/1
10 TABLET ORAL DAILY
Qty: 90 TABLET | Refills: 11 | Status: SHIPPED | OUTPATIENT
Start: 2018-06-19 | End: 2019-07-26

## 2018-06-19 RX ORDER — CETIRIZINE HYDROCHLORIDE 10 MG/1
10 TABLET ORAL DAILY
Qty: 90 TABLET | Refills: 11 | Status: SHIPPED | OUTPATIENT
Start: 2018-06-19 | End: 2018-06-19

## 2018-06-19 RX ORDER — LORAZEPAM 0.5 MG/1
0.25-0.5 TABLET ORAL EVERY 8 HOURS PRN
Qty: 2 TABLET | Refills: 0 | Status: SHIPPED | OUTPATIENT
Start: 2018-06-19 | End: 2023-07-24

## 2018-06-19 RX ORDER — FLUTICASONE PROPIONATE 50 MCG
2 SPRAY, SUSPENSION (ML) NASAL DAILY
Qty: 1 BOTTLE | Refills: 11 | Status: SHIPPED | OUTPATIENT
Start: 2018-06-19 | End: 2018-06-19

## 2018-06-19 RX ORDER — FLUTICASONE PROPIONATE 50 MCG
2 SPRAY, SUSPENSION (ML) NASAL DAILY
Qty: 1 BOTTLE | Refills: 11 | Status: SHIPPED | OUTPATIENT
Start: 2018-06-19 | End: 2019-07-26

## 2018-06-19 RX ORDER — GUAIFENESIN AND DEXTROMETHORPHAN HYDROBROMIDE 1200; 60 MG/1; MG/1
1 TABLET, EXTENDED RELEASE ORAL 2 TIMES DAILY PRN
Qty: 60 TABLET | Refills: 11 | Status: SHIPPED | OUTPATIENT
Start: 2018-06-19 | End: 2022-08-12

## 2018-06-19 RX ORDER — ZOLPIDEM TARTRATE 5 MG/1
5 TABLET ORAL
Qty: 30 TABLET | Refills: 0 | Status: SHIPPED | OUTPATIENT
Start: 2018-06-19 | End: 2019-07-26

## 2018-06-19 RX ORDER — VALACYCLOVIR HYDROCHLORIDE 1 G/1
2000 TABLET, FILM COATED ORAL 2 TIMES DAILY
Qty: 20 TABLET | Refills: 6 | Status: SHIPPED | OUTPATIENT
Start: 2018-06-19 | End: 2019-07-26

## 2018-06-19 NOTE — NURSING NOTE
"Chief Complaint   Patient presents with     Physical     AFE, Pt already had labs done     Health Maintenance     orders pended        Initial /88  Pulse 68  Temp 97.1  F (36.2  C) (Oral)  Resp 14  Ht 5' 5\" (1.651 m)  Wt 112 lb (50.8 kg)  SpO2 96%  Breastfeeding? No  BMI 18.64 kg/m2 Estimated body mass index is 18.64 kg/(m^2) as calculated from the following:    Height as of this encounter: 5' 5\" (1.651 m).    Weight as of this encounter: 112 lb (50.8 kg).  Medication Reconciliation: complete      Kaleigh Farris MA    "

## 2018-06-19 NOTE — MR AVS SNAPSHOT
After Visit Summary   6/19/2018    Gabriela Feliciano    MRN: [id number]           Patient Information     Date Of Birth          1960        Visit Information        Provider Department      6/19/2018 4:40 PM Jannette Nur PA-C [facility]        Today's Diagnoses     Encounter for routine adult health examination with abnormal findings    -  1    Screen for colon cancer        Vaginal dryness        TMJ (temporomandibular joint syndrome)        House dust mite allergy        Recurrent herpes labialis        Primary insomnia        Routine general medical examination at a health care facility        Anxiety          Care Instructions    Never mix ambien and ativan  Take ativan prior to mammogram and have     Call elizabeth lyman to schedule breast imaging.  Call .        Preventive Health Recommendations  Female Ages 50 - 64    Yearly exam: See your health care provider every year in order to  o Review health changes.   o Discuss preventive care.    o Review your medicines if your doctor has prescribed any.      Get a Pap test every three years (unless you have an abnormal result and your provider advises testing more often).    If you get Pap tests with HPV test, you only need to test every 5 years, unless you have an abnormal result.     You do not need a Pap test if your uterus was removed (hysterectomy) and you have not had cancer.    You should be tested each year for STDs (sexually transmitted diseases) if you're at risk.     Have a mammogram every 1 to 2 years.    Have a colonoscopy at age 50, or have a yearly FIT test (stool test). These exams screen for colon cancer.      Have a cholesterol test every 5 years, or more often if advised.    Have a diabetes test (fasting glucose) every three years. If you are at risk for diabetes, you should have this test more often.     If you are at risk for osteoporosis (brittle bone disease), think about  having a bone density scan (DEXA).    Shots: Get a flu shot each year. Get a tetanus shot every 10 years.    Nutrition:     Eat at least 5 servings of fruits and vegetables each day.    Eat whole-grain bread, whole-wheat pasta and brown rice instead of white grains and rice.    Talk to your provider about Calcium and Vitamin D.     Lifestyle    Exercise at least 150 minutes a week (30 minutes a day, 5 days a week). This will help you control your weight and prevent disease.    Limit alcohol to one drink per day.    No smoking.     Wear sunscreen to prevent skin cancer.     See your dentist every six months for an exam and cleaning.    See your eye doctor every 1 to 2 years.            Follow-ups after your visit        Future tests that were ordered for you today     Open Future Orders        Priority Expected Expires Ordered    *MA Screening Digital Bilateral Routine  6/19/2019 6/19/2018    Fecal colorectal cancer screen FIT - Future (S+30) Routine 6/21/2018 6/30/2018 6/19/2018            Who to contact     If you have questions or need follow up information about today's clinic visit or your schedule please contact [facility] directly at [phone].  Normal or non-critical lab and imaging results will be communicated to you by MyChart, letter or phone within 4 business days after the clinic has received the results. If you do not hear from us within 7 days, please contact the clinic through [employer] or phone. If you have a critical or abnormal lab result, we will notify you by phone as soon as possible.  Submit refill requests through [employer] or call your pharmacy and they will forward the refill request to us. Please allow 3 business days for your refill to be completed.          Additional Information About Your Visit        MyChart Information     [employer] gives you secure access to your electronic health record. If you see a primary care provider, you can also send messages to your care team and  "make appointments. If you have questions, please call your primary care clinic.  If you do not have a primary care provider, please call [phone] and they will assist you.        Care EveryWhere ID     This is your Care EveryWhere ID. This could be used by other organizations to access your [facility] medical records  [id number]        Your Vitals Were     Pulse Temperature Respirations Height Pulse Oximetry Breastfeeding?    68 97.1  F (36.2  C) (Oral) 14 5' 5\" (1.651 m) 96% No    BMI (Body Mass Index)                   18.64 kg/m2            Blood Pressure from Last 3 Encounters:   06/19/18 130/62   06/15/17 120/82   07/21/16 120/78    Weight from Last 3 Encounters:   06/19/18 112 lb (50.8 kg)   06/15/17 108 lb (49 kg)   07/21/16 106 lb (48.1 kg)                 Today's Medication Changes          These changes are accurate as of 6/19/18  5:14 PM.  If you have any questions, ask your nurse or doctor.               Start taking these medicines.        Dose/Directions    LORazepam 0.5 MG tablet   Commonly known as:  ATIVAN   Used for:  Anxiety   Started by:  Jannette Nur PA-C        Dose:  0.25-0.5 mg   Take 0.5-1 tablets (0.25-0.5 mg) by mouth every 8 hours as needed for anxiety Take 30 minutes prior to departure.  Do not operate a vehicle after taking this medication   Quantity:  2 tablet   Refills:  0            Where to get your medicines      These medications were sent to [id number] [address] [facility] [address] AT [place] [address] [address], [place] [address]     Phone:  [phone]     calcium carbonate-vitamin D 600-200 MG-UNIT Tabs    cetirizine 10 MG tablet    cyclobenzaprine 10 MG tablet    fluticasone 50 MCG/ACT spray    valACYclovir 1000 mg tablet         Some of these will need a paper prescription and others can be bought over the counter.  Ask your nurse if you have questions.     Bring a paper prescription for each of " these medications     LORazepam 0.5 MG tablet    zolpidem 5 MG tablet                Primary Care Provider Office Phone # Fax #    Jannette Nur PA-C [phone] [address] [facility] [address]        Equal Access to Services     ULYSSES AVILES : Hadii aad ku hadasho Soomaali, waaxda luqadaha, qaybta kaalmada adeegyada, waxay idiin hayaan adewilbur lima lasukhjinder . So [facility] [phone].    ATENCIÓN: Si habla español, tiene a ratliff disposición servicios gratuitos de asistencia lingüística. Cachorro al [phone].    We comply with applicable federal civil rights laws and [place] laws. We do not discriminate on the basis of race, color, national origin, age, disability, sex, sexual orientation, or gender identity.            Thank you!     Thank you for choosing [facility]  for your care. Our goal is always to provide you with excellent care. Hearing back from our patients is one way we can continue to improve our services. Please take a few minutes to complete the written survey that you may receive in the mail after your visit with us. Thank you!             Your Updated Medication List - Protect others around you: Learn how to safely use, store and throw away your medicines at www.disposemymeds.org.          This list is accurate as of 6/19/18  5:14 PM.  Always use your most recent med list.                   Brand Name Dispense Instructions for use Diagnosis    calcium carbonate-vitamin D 600-200 MG-UNIT Tabs     30 tablet    Take 1 tablet by mouth daily    Routine general medical examination at a health care facility       cetirizine 10 MG tablet    zyrTEC    90 tablet    Take 1 tablet (10 mg) by mouth daily    Vaginal dryness       CITRACAL + D PO      1 tablet daily        cyclobenzaprine 10 MG tablet    FLEXERIL    30 tablet    Take 1 tablet (10 mg) by mouth 3 times daily as needed for muscle spasms    TMJ (temporomandibular joint syndrome)        Dextromethorphan-Guaifenesin  MG Tb12     60 tablet    Take 1 tablet by mouth 2 times daily as needed    Non-seasonal allergic rhinitis due to other allergic trigger       fluticasone 50 MCG/ACT spray    FLONASE    1 Bottle    Spray 2 sprays into both nostrils daily    House dust mite allergy       LORazepam 0.5 MG tablet    ATIVAN    2 tablet    Take 0.5-1 tablets (0.25-0.5 mg) by mouth every 8 hours as needed for anxiety Take 30 minutes prior to departure.  Do not operate a vehicle after taking this medication    Anxiety       * teriparatide (recombinant) 600 MCG/2.4ML Soln injection    FORTEO     Inject 20 mcg Subcutaneous        * FORTEO 600 MCG/2.4ML Soln injection   Generic drug:  teriparatide (recombinant)     2.4 mL    Inject 0.08 mLs (20 mcg) Subcutaneous daily        valACYclovir 1000 mg tablet    VALTREX    20 tablet    Take 2 tablets (2,000 mg) by mouth 2 times daily    Recurrent herpes labialis       zolpidem 5 MG tablet    AMBIEN    30 tablet    Take 1 tablet (5 mg) by mouth nightly as needed for sleep    Primary insomnia       * Notice:  This list has 2 medication(s) that are the same as other medications prescribed for you. Read the directions carefully, and ask your doctor or other care provider to review them with you.

## 2018-07-01 PROCEDURE — 82274 ASSAY TEST FOR BLOOD FECAL: CPT | Performed by: PHYSICIAN ASSISTANT

## 2018-07-04 LAB — HEMOCCULT STL QL IA: NEGATIVE

## 2018-07-05 DIAGNOSIS — Z12.11 SCREEN FOR COLON CANCER: ICD-10-CM

## 2018-07-05 NOTE — PROGRESS NOTES
Marielos Ochoa,       Your recent test results are attached, if you have any questions or concerns please feel free to contact me via e-mail or call [phone].  Occult blood test (FIT test) was normal.  No hidden blood was found in your stool.  This is a screening test for colon cancer and is recommended yearly.           It was a pleasure to see you at your recent office visit.      Sincerely,  Jannette Nur PA-C

## 2018-11-06 ENCOUNTER — RADIANT APPOINTMENT (OUTPATIENT)
Dept: MAMMOGRAPHY | Facility: CLINIC | Age: 58
End: 2018-11-06
Attending: PHYSICIAN ASSISTANT
Payer: COMMERCIAL

## 2018-11-06 DIAGNOSIS — Z00.00 ROUTINE GENERAL MEDICAL EXAMINATION AT A HEALTH CARE FACILITY: ICD-10-CM

## 2018-11-06 PROCEDURE — 77067 SCR MAMMO BI INCL CAD: CPT

## 2019-06-28 NOTE — PROGRESS NOTES
"   SUBJECTIVE:   CC: Gabriela Feliciano is an 59 year old woman who presents for preventive health visit.     Healthy Habits:    Do you get at least three servings of calcium containing foods daily (dairy, green leafy vegetables, etc.)? { :384849::\"yes\"}    Amount of exercise or daily activities, outside of work: { :850730}    Problems taking medications regularly { :048987::\"No\"}    Medication side effects: { :611560::\"No\"}    Have you had an eye exam in the past two years? { :740552}    Do you see a dentist twice per year? { :214937}    Do you have sleep apnea, excessive snoring or daytime drowsiness?{ :644934}  {Outside tests to abstract? :964004}    {additional problems to add (Optional):096443}    Today's PHQ-2 Score:   PHQ-2 ( 1999 Pfizer) 6/19/2018 6/15/2017   Q1: Little interest or pleasure in doing things 0 0   Q2: Feeling down, depressed or hopeless 0 0   PHQ-2 Score 0 0   Q1: Little interest or pleasure in doing things Not at all -   Q2: Feeling down, depressed or hopeless Not at all -   PHQ-2 Score 0 -     {PHQ-2 LOOK IN ASSESSMENTS (Optional) :328665}  Abuse: Current or Past(Physical, Sexual or Emotional)- {YES/NO/NA:195569}  Do you feel safe in your environment? {YES/NO/NA:716545}    Social History     Tobacco Use     Smoking status: Never Smoker     Smokeless tobacco: Never Used   Substance Use Topics     Alcohol use: Yes     Comment: wine every 1 weeks     If you drink alcohol do you typically have >3 drinks per day or >7 drinks per week? {ETOH :455023}                     Reviewed orders with patient.  Reviewed health maintenance and updated orders accordingly - {Yes/No:645536::\"Yes\"}  {Chronicprobdata (Optional):519950}    {Mammo Decision Support (Optional):858263}    Pertinent mammograms are reviewed under the imaging tab.  History of abnormal Pap smear: {PAP HX:675576}  PAP / HPV Latest Ref Rng & Units 4/22/2015 7/19/2011   PAP - NIL NIL   HPV 16 DNA NEG Negative -   HPV 18 DNA NEG Negative - " "  OTHER HR HPV NEG Negative -     Reviewed and updated as needed this visit by clinical staff         Reviewed and updated as needed this visit by Provider        {HISTORY OPTIONS (Optional):228902}    ROS:  { :836153}    OBJECTIVE:   There were no vitals taken for this visit.  EXAM:  {Exam Choices:013496}    {Diagnostic Test Results (Optional):507103::\"Diagnostic Test Results:\",\"Labs reviewed in Epic\"}    ASSESSMENT/PLAN:   {Diag Picklist:144000}    COUNSELING:   {FEMALE COUNSELING MESSAGES:604937::\"Reviewed preventive health counseling, as reflected in patient instructions\"}    Estimated body mass index is 18.64 kg/m  as calculated from the following:    Height as of 6/19/18: 1.651 m (5' 5\").    Weight as of 6/19/18: 50.8 kg (112 lb).    {Weight Management Plan (ACO) Complete if BMI is abnormal-  Ages 18-64  BMI >24.9.  Age 65+ with BMI <23 or >30 (Optional):750932}     reports that she has never smoked. She has never used smokeless tobacco.  {Tobacco Cessation -- Complete if patient is a smoker (Optional):073145}    Counseling Resources:  ATP IV Guidelines  Pooled Cohorts Equation Calculator  Breast Cancer Risk Calculator  FRAX Risk Assessment  ICSI Preventive Guidelines  Dietary Guidelines for Americans, 2010  USDA's MyPlate  ASA Prophylaxis  Lung CA Screening    Jannette Nur PA-C  [facility]  "

## 2019-07-17 ENCOUNTER — DOCUMENTATION ONLY (OUTPATIENT)
Dept: FAMILY MEDICINE | Facility: CLINIC | Age: 59
End: 2019-07-17

## 2019-07-17 DIAGNOSIS — Z13.1 SCREENING FOR DIABETES MELLITUS: ICD-10-CM

## 2019-07-17 DIAGNOSIS — E78.5 HYPERLIPIDEMIA LDL GOAL <160: ICD-10-CM

## 2019-07-17 DIAGNOSIS — Z00.00 ROUTINE HISTORY AND PHYSICAL EXAMINATION OF ADULT: Primary | ICD-10-CM

## 2019-07-17 NOTE — PROGRESS NOTES
.Please place or confirm pending lab orders for upcoming lab appointment on 7/19/19  Thank you,  Alla

## 2019-07-19 DIAGNOSIS — E78.5 HYPERLIPIDEMIA LDL GOAL <160: ICD-10-CM

## 2019-07-19 DIAGNOSIS — Z00.00 ROUTINE HISTORY AND PHYSICAL EXAMINATION OF ADULT: ICD-10-CM

## 2019-07-19 LAB
ALBUMIN SERPL-MCNC: 3.9 G/DL (ref 3.4–5)
ALP SERPL-CCNC: 177 U/L (ref 40–150)
ALT SERPL W P-5'-P-CCNC: 32 U/L (ref 0–50)
ANION GAP SERPL CALCULATED.3IONS-SCNC: 3 MMOL/L (ref 3–14)
AST SERPL W P-5'-P-CCNC: 21 U/L (ref 0–45)
BILIRUB SERPL-MCNC: 0.7 MG/DL (ref 0.2–1.3)
BUN SERPL-MCNC: 13 MG/DL (ref 7–30)
CALCIUM SERPL-MCNC: 9.3 MG/DL (ref 8.5–10.1)
CHLORIDE SERPL-SCNC: 105 MMOL/L (ref 94–109)
CHOLEST SERPL-MCNC: 249 MG/DL
CO2 SERPL-SCNC: 30 MMOL/L (ref 20–32)
CREAT SERPL-MCNC: 0.73 MG/DL (ref 0.52–1.04)
GFR SERPL CREATININE-BSD FRML MDRD: 89 ML/MIN/{1.73_M2}
GLUCOSE SERPL-MCNC: 94 MG/DL (ref 70–99)
HDLC SERPL-MCNC: 74 MG/DL
LDLC SERPL CALC-MCNC: 162 MG/DL
NONHDLC SERPL-MCNC: 175 MG/DL
POTASSIUM SERPL-SCNC: 3.9 MMOL/L (ref 3.4–5.3)
PROT SERPL-MCNC: 7.4 G/DL (ref 6.8–8.8)
SODIUM SERPL-SCNC: 138 MMOL/L (ref 133–144)
TRIGL SERPL-MCNC: 64 MG/DL

## 2019-07-19 PROCEDURE — 36415 COLL VENOUS BLD VENIPUNCTURE: CPT | Performed by: PHYSICIAN ASSISTANT

## 2019-07-19 PROCEDURE — 80053 COMPREHEN METABOLIC PANEL: CPT | Performed by: PHYSICIAN ASSISTANT

## 2019-07-19 PROCEDURE — 80061 LIPID PANEL: CPT | Performed by: PHYSICIAN ASSISTANT

## 2019-07-19 NOTE — RESULT ENCOUNTER NOTE
Marielos Ochoa,    I am contacting you on behalf of Jannette Nur who is currently not available.    Your cholesterol and one of your liver enzymes is elevated. Please discuss this at your upcoming appointment on 7/26/19.    Regards,    Nicholas Zamudio M.D.

## 2019-07-20 ASSESSMENT — ENCOUNTER SYMPTOMS
WEAKNESS: 0
ABDOMINAL PAIN: 0
ARTHRALGIAS: 1
FEVER: 0
BREAST MASS: 0
COUGH: 0
DIZZINESS: 0
DIARRHEA: 0
PALPITATIONS: 0
DYSURIA: 0
NAUSEA: 0
EYE PAIN: 0
FREQUENCY: 0
SORE THROAT: 0
CONSTIPATION: 0
HEMATOCHEZIA: 0
HEMATURIA: 0
PARESTHESIAS: 0
MYALGIAS: 0
HEADACHES: 1
JOINT SWELLING: 0
CHILLS: 0
SHORTNESS OF BREATH: 0
HEARTBURN: 0
NERVOUS/ANXIOUS: 0

## 2019-07-26 ENCOUNTER — OFFICE VISIT (OUTPATIENT)
Dept: FAMILY MEDICINE | Facility: CLINIC | Age: 59
End: 2019-07-26
Payer: COMMERCIAL

## 2019-07-26 VITALS
DIASTOLIC BLOOD PRESSURE: 80 MMHG | BODY MASS INDEX: 17.34 KG/M2 | HEART RATE: 80 BPM | SYSTOLIC BLOOD PRESSURE: 123 MMHG | OXYGEN SATURATION: 100 % | WEIGHT: 104.2 LBS | RESPIRATION RATE: 18 BRPM

## 2019-07-26 DIAGNOSIS — E78.5 HYPERLIPIDEMIA LDL GOAL <160: ICD-10-CM

## 2019-07-26 DIAGNOSIS — F51.01 PRIMARY INSOMNIA: ICD-10-CM

## 2019-07-26 DIAGNOSIS — Z91.09 HOUSE DUST MITE ALLERGY: ICD-10-CM

## 2019-07-26 DIAGNOSIS — Z00.00 ROUTINE GENERAL MEDICAL EXAMINATION AT A HEALTH CARE FACILITY: Primary | ICD-10-CM

## 2019-07-26 DIAGNOSIS — B00.1 RECURRENT HERPES LABIALIS: ICD-10-CM

## 2019-07-26 DIAGNOSIS — M26.609 TMJ (TEMPOROMANDIBULAR JOINT SYNDROME): ICD-10-CM

## 2019-07-26 PROCEDURE — 99396 PREV VISIT EST AGE 40-64: CPT | Performed by: PHYSICIAN ASSISTANT

## 2019-07-26 PROCEDURE — 99213 OFFICE O/P EST LOW 20 MIN: CPT | Mod: 25 | Performed by: PHYSICIAN ASSISTANT

## 2019-07-26 RX ORDER — FLUTICASONE PROPIONATE 50 MCG
2 SPRAY, SUSPENSION (ML) NASAL DAILY
Qty: 18.2 ML | Refills: 11 | Status: SHIPPED | OUTPATIENT
Start: 2019-07-26 | End: 2020-08-14

## 2019-07-26 RX ORDER — ZOLPIDEM TARTRATE 5 MG/1
5 TABLET ORAL
Qty: 30 TABLET | Refills: 0 | Status: SHIPPED | OUTPATIENT
Start: 2019-07-26 | End: 2021-05-14

## 2019-07-26 RX ORDER — FLUTICASONE PROPIONATE 50 MCG
2 SPRAY, SUSPENSION (ML) NASAL DAILY
Qty: 18.2 ML | Refills: 11 | Status: SHIPPED | OUTPATIENT
Start: 2019-07-26 | End: 2019-07-26

## 2019-07-26 RX ORDER — GUAIFENESIN AND DEXTROMETHORPHAN HYDROBROMIDE 600; 30 MG/1; MG/1
1 TABLET, EXTENDED RELEASE ORAL EVERY 12 HOURS
Qty: 60 TABLET | Refills: 11 | Status: SHIPPED | OUTPATIENT
Start: 2019-07-26 | End: 2022-08-12

## 2019-07-26 RX ORDER — VALACYCLOVIR HYDROCHLORIDE 1 G/1
2000 TABLET, FILM COATED ORAL 2 TIMES DAILY
Qty: 20 TABLET | Refills: 6 | Status: SHIPPED | OUTPATIENT
Start: 2019-07-26 | End: 2021-05-14

## 2019-07-26 RX ORDER — IBUPROFEN 200 MG
1 CAPSULE ORAL DAILY
Qty: 90 TABLET | Refills: 3 | Status: SHIPPED | OUTPATIENT
Start: 2019-07-26 | End: 2022-08-12

## 2019-07-26 RX ORDER — CETIRIZINE HYDROCHLORIDE 10 MG/1
10 TABLET ORAL DAILY
Qty: 90 TABLET | Refills: 11 | Status: SHIPPED | OUTPATIENT
Start: 2019-07-26

## 2019-07-26 RX ORDER — CYCLOBENZAPRINE HCL 10 MG
10 TABLET ORAL 3 TIMES DAILY PRN
Qty: 30 TABLET | Refills: 3 | Status: SHIPPED | OUTPATIENT
Start: 2019-07-26 | End: 2020-03-30

## 2019-07-26 ASSESSMENT — ENCOUNTER SYMPTOMS
MYALGIAS: 0
JOINT SWELLING: 0
SHORTNESS OF BREATH: 0
PARESTHESIAS: 0
CONSTIPATION: 0
NERVOUS/ANXIOUS: 0
DYSURIA: 0
EYE PAIN: 0
FREQUENCY: 0
ARTHRALGIAS: 1
DIARRHEA: 0
DIZZINESS: 0
NAUSEA: 0
CHILLS: 0
COUGH: 0
HEARTBURN: 0
ABDOMINAL PAIN: 0
HEMATURIA: 0
SORE THROAT: 0
HEMATOCHEZIA: 0
WEAKNESS: 0
HEADACHES: 1
BREAST MASS: 0
PALPITATIONS: 0
FEVER: 0

## 2019-07-26 NOTE — PROGRESS NOTES
SUBJECTIVE:   CC: Gabriela Feliciano is an 59 year old woman who presents for preventive health visit.     Healthy Habits:     Getting at least 3 servings of Calcium per day:  Yes    Bi-annual eye exam:  Yes    Dental care twice a year:  Yes    Sleep apnea or symptoms of sleep apnea:  None    Diet:  Regular (no restrictions)    Frequency of exercise:  4-5 days/week    Duration of exercise:  15-30 minutes    Taking medications regularly:  Yes    Medication side effects:  Not applicable    PHQ-2 Total Score: 0    Additional concerns today:  No    No concerns       Already had fasting labs-we discussed these. Elevated lipids but doenst need statin yet.     Not due for pap.     Due for fit card. Wants cologuard instead.     Has slightly elevated alk phosh, which is expected while taking forteo.       tmj-flexeril PRn helps.     ambien-takes sparingly.     Ativan-only before a procedure.     We discussed side effects and risks of this medication today including addiction, tolerance, increased sedation, respiratory depression, and possibly overdose if not taken as directed.   They will not mix this medication with alcohol or other sedating medications. They will not drive after taking this medication.  Patient states understanding. They verbally agreed to use their medication responsibly.       Today's PHQ-2 Score:   PHQ-2 ( 1999 Pfizer) 7/20/2019   Q1: Little interest or pleasure in doing things 0   Q2: Feeling down, depressed or hopeless 0   PHQ-2 Score 0   Q1: Little interest or pleasure in doing things Not at all   Q2: Feeling down, depressed or hopeless Not at all   PHQ-2 Score 0     The 10-year CVD risk score (D'Agostino, et al., 2008) is: 6.4%    Values used to calculate the score:      Age: 59 years      Sex: Female      Diabetic: No      Tobacco smoker: No      Systolic Blood Pressure: 123 mmHg      Is BP treated: No      HDL Cholesterol: 74 mg/dL      Total Cholesterol: 249 mg/dL    Abuse: Current or  Past(Physical, Sexual or Emotional)- No  Do you feel safe in your environment? Yes    Social History     Tobacco Use     Smoking status: Never Smoker     Smokeless tobacco: Never Used   Substance Use Topics     Alcohol use: Yes     Comment: wine every 1 weeks         Alcohol Use 2019   Prescreen: >3 drinks/day or >7 drinks/week? No   Prescreen: >3 drinks/day or >7 drinks/week? -       Reviewed orders with patient.  Reviewed health maintenance and updated orders accordingly - Yes  Lab work is in process  Labs reviewed in EPIC  BP Readings from Last 3 Encounters:   19 123/80   18 130/62   06/15/17 120/82    Wt Readings from Last 3 Encounters:   19 47.3 kg (104 lb 3.2 oz)   18 50.8 kg (112 lb)   06/15/17 49 kg (108 lb)                  Patient Active Problem List   Diagnosis     House dust mite allergy     GERD (gastroesophageal reflux disease)     TMJ (temporomandibular joint syndrome)     Diagnostic skin and sensitization tests     Recurrent herpes labialis     Hyperlipidemia LDL goal <160     Post-menopausal     Family history of breast cancer in first degree relative     Generalized anxiety disorder     Advanced directives, counseling/discussion     Osteoporosis     Past Surgical History:   Procedure Laterality Date     NO HISTORY OF SURGERY         Social History     Tobacco Use     Smoking status: Never Smoker     Smokeless tobacco: Never Used   Substance Use Topics     Alcohol use: Yes     Comment: wine every 1 weeks     Family History   Problem Relation Age of Onset     Hypertension Mother      Cerebrovascular Disease Mother      Heart Disease Mother          of MI age 78     Heart Failure Father      Unknown/Adopted Maternal Grandmother      Circulatory Maternal Grandfather         hrafening of arteries/leg amputated     Unknown/Adopted Paternal Grandmother      Unknown/Adopted Paternal Grandfather      Obesity Brother      Cerebrovascular Disease Maternal Aunt       Cerebrovascular Disease Maternal Aunt      Breast Cancer Maternal Aunt            Mammogram Screening: Patient over age 50, mutual decision to screen reflected in health maintenance.    Pertinent mammograms are reviewed under the imaging tab.  History of abnormal Pap smear: NO - age 30-65 PAP every 5 years with negative HPV co-testing recommended  PAP / HPV Latest Ref Rng & Units 2015   PAP - NIL NIL   HPV 16 DNA NEG Negative -   HPV 18 DNA NEG Negative -   OTHER HR HPV NEG Negative -     Reviewed and updated as needed this visit by clinical staff  Tobacco  Allergies  Meds  Med Hx  Surg Hx  Fam Hx  Soc Hx        Reviewed and updated as needed this visit by Provider        Past Medical History:   Diagnosis Date     Diagnostic skin and sensitization tests 3/94 skin tests pos. for DM only--all other environemental allergens NEGATIVE     Dyslipidemia      GERD (gastroesophageal reflux disease)      House dust mite allergy 3/94 skin tests    only DM     Post menopausal problems      TMJ disorder      Vitamin D deficiency       Past Surgical History:   Procedure Laterality Date     NO HISTORY OF SURGERY       OB History    Para Term  AB Living   2 2 2 0 0 2   SAB TAB Ectopic Multiple Live Births   0 0 0 0 2      # Outcome Date GA Lbr Santos/2nd Weight Sex Delivery Anes PTL Lv   2 Term         KASSIDY   1 Term         KASSIDY       Review of Systems   Constitutional: Negative for chills and fever.   HENT: Positive for congestion. Negative for ear pain, hearing loss and sore throat.    Eyes: Negative for pain and visual disturbance.   Respiratory: Negative for cough and shortness of breath.    Cardiovascular: Negative for chest pain, palpitations and peripheral edema.   Gastrointestinal: Negative for abdominal pain, constipation, diarrhea, heartburn, hematochezia and nausea.   Breasts:  Negative for tenderness, breast mass and discharge.   Genitourinary: Negative for dysuria, frequency,  genital sores, hematuria, pelvic pain, urgency, vaginal bleeding and vaginal discharge.   Musculoskeletal: Positive for arthralgias. Negative for joint swelling and myalgias.   Skin: Negative for rash.   Neurological: Positive for headaches. Negative for dizziness, weakness and paresthesias.   Psychiatric/Behavioral: Negative for mood changes. The patient is not nervous/anxious.      CONSTITUTIONAL: NEGATIVE for fever, chills, change in weight  INTEGUMENTARY/SKIN: NEGATIVE for worrisome rashes, moles or lesions  EYES: NEGATIVE for vision changes or irritation  ENT: NEGATIVE for ear, mouth and throat problems  RESP: NEGATIVE for significant cough or SOB  BREAST: NEGATIVE for masses, tenderness or discharge  CV: NEGATIVE for chest pain, palpitations or peripheral edema  GI: NEGATIVE for nausea, abdominal pain, heartburn, or change in bowel habits  : NEGATIVE for unusual urinary or vaginal symptoms. No vaginal bleeding.  MUSCULOSKELETAL: NEGATIVE for significant arthralgias or myalgia  NEURO: NEGATIVE for weakness, dizziness or paresthesias  PSYCHIATRIC: NEGATIVE for changes in mood or affect      OBJECTIVE:   /80   Pulse 80   Resp 18   Wt 47.3 kg (104 lb 3.2 oz)   SpO2 100%   BMI 17.34 kg/m    Physical Exam  GENERAL: healthy, alert and no distress  EYES: Eyes grossly normal to inspection, PERRL and conjunctivae and sclerae normal  HENT: ear canals and TM's normal, nose and mouth without ulcers or lesions  NECK: no adenopathy, no asymmetry, masses, or scars and thyroid normal to palpation  RESP: lungs clear to auscultation - no rales, rhonchi or wheezes  BREAST: normal without masses, tenderness or nipple discharge and no palpable axillary masses or adenopathy  CV: regular rate and rhythm, normal S1 S2, no S3 or S4, no murmur, click or rub, no peripheral edema and peripheral pulses strong  ABDOMEN: soft, nontender, no hepatosplenomegaly, no masses and bowel sounds normal  MS: no gross musculoskeletal  "defects noted, no edema  SKIN: no suspicious lesions or rashes  NEURO: Normal strength and tone, mentation intact and speech normal  PSYCH: mentation appears normal, affect normal/bright    Diagnostic Test Results:  Labs reviewed in Epic    ASSESSMENT/PLAN:   1. Routine general medical examination at a health care facility    - Fecal colorectal cancer screen (FIT); Future  - calcium citrate (CITRACAL) 950 MG tablet; Take 1 tablet (950 mg) by mouth daily  Dispense: 90 tablet; Refill: 3    2. Hyperlipidemia LDL goal <160      3. TMJ (temporomandibular joint syndrome)    - cyclobenzaprine (FLEXERIL) 10 MG tablet; Take 1 tablet (10 mg) by mouth 3 times daily as needed for muscle spasms  Dispense: 30 tablet; Refill: 3    4. House dust mite allergy    - dextromethorphan-guaiFENesin (MUCINEX DM)  MG 12 hr tablet; Take 1 tablet by mouth every 12 hours  Dispense: 60 tablet; Refill: 11  - cetirizine (ZYRTEC) 10 MG tablet; Take 1 tablet (10 mg) by mouth daily  Dispense: 90 tablet; Refill: 11  - fluticasone (FLONASE) 50 MCG/ACT nasal spray; Spray 2 sprays into both nostrils daily  Dispense: 18.2 mL; Refill: 11    5. Recurrent herpes labialis    - valACYclovir (VALTREX) 1000 mg tablet; Take 2 tablets (2,000 mg) by mouth 2 times daily  Dispense: 20 tablet; Refill: 6    6. Primary insomnia  Stable recheck 6 months    - zolpidem (AMBIEN) 5 MG tablet; Take 1 tablet (5 mg) by mouth nightly as needed for sleep  Dispense: 30 tablet; Refill: 0    COUNSELING:  Reviewed preventive health counseling, as reflected in patient instructions       Regular exercise    Estimated body mass index is 17.34 kg/m  as calculated from the following:    Height as of 6/19/18: 1.651 m (5' 5\").    Weight as of this encounter: 47.3 kg (104 lb 3.2 oz).         reports that she has never smoked. She has never used smokeless tobacco.     Patient Instructions     Preventive Health Recommendations  Female Ages 50 - 64    Yearly exam: See your health care " provider every year in order to  o Review health changes.   o Discuss preventive care.    o Review your medicines if your doctor has prescribed any.      Get a Pap test every three years (unless you have an abnormal result and your provider advises testing more often).    If you get Pap tests with HPV test, you only need to test every 5 years, unless you have an abnormal result.     You do not need a Pap test if your uterus was removed (hysterectomy) and you have not had cancer.    You should be tested each year for STDs (sexually transmitted diseases) if you're at risk.     Have a mammogram every 1 to 2 years.    Have a colonoscopy at age 50, or have a yearly FIT test (stool test). These exams screen for colon cancer.      Have a cholesterol test every 5 years, or more often if advised.    Have a diabetes test (fasting glucose) every three years. If you are at risk for diabetes, you should have this test more often.     If you are at risk for osteoporosis (brittle bone disease), think about having a bone density scan (DEXA).    Shots: Get a flu shot each year. Get a tetanus shot every 10 years.    Nutrition:     Eat at least 5 servings of fruits and vegetables each day.    Eat whole-grain bread, whole-wheat pasta and brown rice instead of white grains and rice.    Get adequate Calcium and Vitamin D.     Lifestyle    Exercise at least 150 minutes a week (30 minutes a day, 5 days a week). This will help you control your weight and prevent disease.    Limit alcohol to one drink per day.    No smoking.     Wear sunscreen to prevent skin cancer.     See your dentist every six months for an exam and cleaning.    See your eye doctor every 1 to 2 years.            Counseling Resources:  ATP IV Guidelines  Pooled Cohorts Equation Calculator  Breast Cancer Risk Calculator  FRAX Risk Assessment  ICSI Preventive Guidelines  Dietary Guidelines for Americans, 2010  USDA's MyPlate  ASA Prophylaxis  Lung CA Screening    Jannette  Anupama Nur PA-C  [facility]

## 2019-08-17 ENCOUNTER — TRANSFERRED RECORDS (OUTPATIENT)
Dept: HEALTH INFORMATION MANAGEMENT | Facility: CLINIC | Age: 59
End: 2019-08-17

## 2019-08-17 LAB — COLOGUARD-ABSTRACT: POSITIVE

## 2019-08-23 ENCOUNTER — TELEPHONE (OUTPATIENT)
Dept: FAMILY MEDICINE | Facility: CLINIC | Age: 59
End: 2019-08-23

## 2019-08-23 DIAGNOSIS — Z12.11 SPECIAL SCREENING FOR MALIGNANT NEOPLASMS, COLON: Primary | ICD-10-CM

## 2019-08-23 NOTE — TELEPHONE ENCOUNTER
Patient informed of provider note as below  Patient verbalized understanding    Wendy Price BSN, RN, CPN

## 2019-08-28 ENCOUNTER — TELEPHONE (OUTPATIENT)
Dept: FAMILY MEDICINE | Facility: CLINIC | Age: 59
End: 2019-08-28

## 2019-08-28 NOTE — TELEPHONE ENCOUNTER
Reason for Call:  Other     Detailed comments: fdid we receive the  Results of cologuard test if not call lab.  Caller informed that calls received after 3pm may not be returned same day.  Thank you    Phone Number Patient can be reached at: Other phone number:[phone]      Best Time: any    Can we leave a detailed message on this number? NO    Call taken on 8/28/2019 at 4:22 PM by Vida Queen

## 2019-08-29 NOTE — TELEPHONE ENCOUNTER
We did receive results and patient was informed of the positive Cologuard on 8/23/19.Britta Bernal MA/[place]

## 2019-11-06 ENCOUNTER — HEALTH MAINTENANCE LETTER (OUTPATIENT)
Age: 59
End: 2019-11-06

## 2020-03-30 ENCOUNTER — MYC REFILL (OUTPATIENT)
Dept: FAMILY MEDICINE | Facility: CLINIC | Age: 60
End: 2020-03-30

## 2020-03-30 DIAGNOSIS — M26.609 TMJ (TEMPOROMANDIBULAR JOINT SYNDROME): ICD-10-CM

## 2020-03-31 RX ORDER — CYCLOBENZAPRINE HCL 10 MG
10 TABLET ORAL 3 TIMES DAILY PRN
Qty: 30 TABLET | Refills: 3 | Status: SHIPPED | OUTPATIENT
Start: 2020-03-31 | End: 2021-05-14

## 2020-04-28 DIAGNOSIS — M26.609 TMJ (TEMPOROMANDIBULAR JOINT SYNDROME): ICD-10-CM

## 2020-04-29 RX ORDER — CYCLOBENZAPRINE HCL 10 MG
TABLET ORAL
Qty: 30 TABLET | Refills: 3 | OUTPATIENT
Start: 2020-04-29

## 2020-04-29 NOTE — TELEPHONE ENCOUNTER
Routing refill request to provider for review/approval because:  Drug not on the [facility] refill protocol   Shagufta Braden BSN, RN

## 2020-04-30 NOTE — TELEPHONE ENCOUNTER
Patient called back and said that she does not need this medication. She called the pharmacy and said to cancell it.Britta Bernal MA/[place]

## 2020-08-24 ENCOUNTER — DOCUMENTATION ONLY (OUTPATIENT)
Dept: LAB | Facility: CLINIC | Age: 60
End: 2020-08-24

## 2020-08-24 DIAGNOSIS — E78.5 HYPERLIPIDEMIA LDL GOAL <160: Primary | ICD-10-CM

## 2020-08-24 DIAGNOSIS — Z00.00 ROUTINE HISTORY AND PHYSICAL EXAMINATION OF ADULT: ICD-10-CM

## 2020-08-24 DIAGNOSIS — Z13.1 SCREENING FOR DIABETES MELLITUS: ICD-10-CM

## 2020-09-12 DIAGNOSIS — Z91.09 HOUSE DUST MITE ALLERGY: ICD-10-CM

## 2020-09-12 NOTE — LETTER
September 15, 2020    Gabriela Feliciano  [address]  [place] [facility] [address]    Dear Gabriela,       We recently received a refill request for fluticasone (FLONASE) 50 MCG/ACT nasal spray.  We were unable to refill this because you are due for a:    Allergy/medication check office visit Jannette said that this can also be an E-visit.      Please call at your earliest convenience so that there will not be a delay with your future refills.          Thank you,   Your [facility] Team/  [phone]

## 2020-09-14 RX ORDER — FLUTICASONE PROPIONATE 50 MCG
SPRAY, SUSPENSION (ML) NASAL
Qty: 16 G | Refills: 0 | OUTPATIENT
Start: 2020-09-14

## 2020-09-14 NOTE — TELEPHONE ENCOUNTER
APPT canceled 8/31/20, no pending appointment.  Last office visit 7/26/19.  Please advise on refill.  Telma Spicer RN

## 2020-11-29 ENCOUNTER — HEALTH MAINTENANCE LETTER (OUTPATIENT)
Age: 60
End: 2020-11-29

## 2021-01-15 ENCOUNTER — HEALTH MAINTENANCE LETTER (OUTPATIENT)
Age: 61
End: 2021-01-15

## 2021-04-16 ENCOUNTER — IMMUNIZATION (OUTPATIENT)
Dept: FAMILY MEDICINE | Facility: CLINIC | Age: 61
End: 2021-04-16
Payer: COMMERCIAL

## 2021-04-16 PROCEDURE — 91301 PR COVID VAC MODERNA 100 MCG/0.5 ML IM: CPT

## 2021-04-16 PROCEDURE — 0011A PR COVID VAC MODERNA 100 MCG/0.5 ML IM: CPT

## 2021-05-06 NOTE — PROGRESS NOTES
Gabriela is a 60 year old who is being evaluated via a billable video visit.      How would you like to obtain your AVS? MyChart  If the video visit is dropped, the invitation should be resent by: [phone]  Will anyone else be joining your video visit? No    Video Start Time: 12:03 PM    Assessment & Plan     Primary insomnia  Stable  One Rx usually lasts her all year  - zolpidem (AMBIEN) 5 MG tablet; Take 1 tablet (5 mg) by mouth nightly as needed for sleep    House dust mite allergy  Stable- fluticasone (FLONASE) 50 MCG/ACT nasal spray; SHAKE LIQUID AND USE 2 SPRAYS IN EACH NOSTRIL DAILY    TMJ (temporomandibular joint syndrome)  Stable  - cyclobenzaprine (FLEXERIL) 10 MG tablet; Take 1 tablet (10 mg) by mouth 3 times daily as needed for muscle spasms    Recurrent herpes labialis  Stable  Need creatinine, she will come in for this  - valACYclovir (VALTREX) 1000 mg tablet; Take 2 tablets (2,000 mg) by mouth 2 times daily  - **Basic metabolic panel FUTURE anytime; Future    Lipid screening  Stable  - Lipid panel reflex to direct LDL Fasting; Future    Patient Instructions   Please return fasting for cholesterol and diabetes screening, you can make a lab only appointment for this.  No food or drink other than water for 10 hours. Or come to physical fasting.             Return in about 3 months (around 8/14/2021) for Lab Work, Physical Exam, pap.    JORGE [facility]   Gabriela is a 60 year old who presents for the following health issues  accompanied by her Self:    HPI     Sleep med check and refills. Also needing flexeril, fluticasone and valtrex refilled.     Is going to get her second covid shot soon.       Has used ambien prn and not very often in the past.   tmj-flexeril PRn helps.      ambien-takes sparingly.  No side effects. Works well for her prn.     Valtrex- takes for recurrent herpes labialis. No issues. Takes prn cold sores.     Labs-is due  for all labs.     flonase-no issues. Helps allergies.     Takes flexeril for tmj.  Involuntary clenches.  Helps a lot. No side effects.     [place] registry-no fills in past year.       {    Review of Systems   Constitutional, HEENT, cardiovascular, pulmonary, GI, , musculoskeletal, neuro, skin, endocrine and psych systems are negative, except as otherwise noted.      Objective           Vitals:  No vitals were obtained today due to virtual visit.    Physical Exam   GENERAL: Healthy, alert and no distress  EYES: Eyes grossly normal to inspection.  No discharge or erythema, or obvious scleral/conjunctival abnormalities.  RESP: No audible wheeze, cough, or visible cyanosis.  No visible retractions or increased work of breathing.    SKIN: Visible skin clear. No significant rash, abnormal pigmentation or lesions.  NEURO: Cranial nerves grossly intact.  Mentation and speech appropriate for age.  PSYCH: Mentation appears normal, affect normal/bright, judgement and insight intact, normal speech and appearance well-groomed.            Video-Visit Details    Type of service:  Video Visit    Video End Time:12:12 PM    Originating Location (pt. Location): Home    [facility]     Platform used for Video Visit: [employer]

## 2021-05-14 ENCOUNTER — IMMUNIZATION (OUTPATIENT)
Dept: FAMILY MEDICINE | Facility: CLINIC | Age: 61
End: 2021-05-14
Attending: FAMILY MEDICINE
Payer: COMMERCIAL

## 2021-05-14 ENCOUNTER — VIRTUAL VISIT (OUTPATIENT)
Dept: FAMILY MEDICINE | Facility: CLINIC | Age: 61
End: 2021-05-14
Payer: COMMERCIAL

## 2021-05-14 DIAGNOSIS — B00.1 RECURRENT HERPES LABIALIS: ICD-10-CM

## 2021-05-14 DIAGNOSIS — Z13.220 LIPID SCREENING: ICD-10-CM

## 2021-05-14 DIAGNOSIS — M26.609 TMJ (TEMPOROMANDIBULAR JOINT SYNDROME): ICD-10-CM

## 2021-05-14 DIAGNOSIS — F51.01 PRIMARY INSOMNIA: Primary | ICD-10-CM

## 2021-05-14 DIAGNOSIS — Z91.09 HOUSE DUST MITE ALLERGY: ICD-10-CM

## 2021-05-14 PROCEDURE — 0012A PR COVID VAC MODERNA 100 MCG/0.5 ML IM: CPT

## 2021-05-14 PROCEDURE — 99214 OFFICE O/P EST MOD 30 MIN: CPT | Mod: 95 | Performed by: PHYSICIAN ASSISTANT

## 2021-05-14 PROCEDURE — 91301 PR COVID VAC MODERNA 100 MCG/0.5 ML IM: CPT

## 2021-05-14 RX ORDER — FLUTICASONE PROPIONATE 50 MCG
SPRAY, SUSPENSION (ML) NASAL
Qty: 16 G | Refills: 11 | Status: SHIPPED | OUTPATIENT
Start: 2021-05-14 | End: 2022-06-08

## 2021-05-14 RX ORDER — VALACYCLOVIR HYDROCHLORIDE 1 G/1
2000 TABLET, FILM COATED ORAL 2 TIMES DAILY
Qty: 20 TABLET | Refills: 6 | Status: SHIPPED | OUTPATIENT
Start: 2021-05-14 | End: 2022-08-12

## 2021-05-14 RX ORDER — CYCLOBENZAPRINE HCL 10 MG
10 TABLET ORAL 3 TIMES DAILY PRN
Qty: 30 TABLET | Refills: 5 | Status: SHIPPED | OUTPATIENT
Start: 2021-05-14 | End: 2022-05-12

## 2021-05-14 RX ORDER — ZOLPIDEM TARTRATE 5 MG/1
5 TABLET ORAL
Qty: 30 TABLET | Refills: 5 | Status: SHIPPED | OUTPATIENT
Start: 2021-05-14 | End: 2022-08-12

## 2021-05-14 NOTE — PATIENT INSTRUCTIONS
Please return fasting for cholesterol and diabetes screening, you can make a lab only appointment for this.  No food or drink other than water for 10 hours. Or come to physical fasting.

## 2021-09-19 ENCOUNTER — HEALTH MAINTENANCE LETTER (OUTPATIENT)
Age: 61
End: 2021-09-19

## 2022-01-09 ENCOUNTER — HEALTH MAINTENANCE LETTER (OUTPATIENT)
Age: 62
End: 2022-01-09

## 2022-02-01 ENCOUNTER — LAB (OUTPATIENT)
Dept: LAB | Facility: CLINIC | Age: 62
End: 2022-02-01
Payer: COMMERCIAL

## 2022-02-01 DIAGNOSIS — Z20.822 CLOSE EXPOSURE TO 2019 NOVEL CORONAVIRUS: ICD-10-CM

## 2022-02-01 PROCEDURE — U0005 INFEC AGEN DETEC AMPLI PROBE: HCPCS | Mod: 90

## 2022-02-01 PROCEDURE — U0003 INFECTIOUS AGENT DETECTION BY NUCLEIC ACID (DNA OR RNA); SEVERE ACUTE RESPIRATORY SYNDROME CORONAVIRUS 2 (SARS-COV-2) (CORONAVIRUS DISEASE [COVID-19]), AMPLIFIED PROBE TECHNIQUE, MAKING USE OF HIGH THROUGHPUT TECHNOLOGIES AS DESCRIBED BY CMS-2020-01-R: HCPCS | Mod: 90

## 2022-02-01 PROCEDURE — 99000 SPECIMEN HANDLING OFFICE-LAB: CPT

## 2022-02-02 LAB — SARS-COV-2 RNA RESP QL NAA+PROBE: NOT DETECTED

## 2022-05-01 ENCOUNTER — NURSE TRIAGE (OUTPATIENT)
Dept: NURSING | Facility: CLINIC | Age: 62
End: 2022-05-01
Payer: COMMERCIAL

## 2022-05-01 NOTE — TELEPHONE ENCOUNTER
"Nurse Triage SBAR    Is this a 2nd Level Triage? NO    Situation:     Patient reporting removing deer tick from chest 5/1/22.  Patient is requesting message to PCP to advise on antibiotic.  Per guidelines home care advice given. Patient prefers to start preventative antibiotic.    Background:     Deer tick removed from chest today. Patient does not feel tick was engorged.   Stating she believes attachment would have been 4/30/22 <36 hours.      Assessment:     Describes tick as \"very small\" poppy seed size.  Tick \"removed in 2 pieces.\" Spouse feels he removed head completely. Denies visible pieces of tick on skin.  Patient reporting mild swelling around area following removal of tick.  Denies redness or discharge.       Protocol Recommended Disposition:   Home Care    Recommendation:     Patient prefers to start antibiotic. Please advise.     Routed to provider    Does the patient meet one of the following criteria for ADS visit consideration? 16+ years old, with an [facility] PCP     TIP  Providers, please consider if this condition is appropriate for management at one of our Acute and Diagnostic Services sites.     If patient is a good candidate, please use dotphrase <dot>triageresponse and select Refer to ADS to document.  Deer tick removed this morning.    Removed from chest.    Reason for Disposition    Tick bite with no complications    Additional Information    Negative: Sounds like a life-threatening emergency to the triager    Negative: Not a tick bite    Negative: [1] 2 to 14 days following tick bite AND [2] severe headache with fever occurs    Negative: [1] 2 to 14 days following tick bite AND [2] widespread rash with fever occurs    Negative: Patient sounds very sick or weak to the triager    Negative: [1] Fever AND [2] red area    Negative: [1] Fever AND [2] area is very tender to touch    Negative: [1] Red streak or red line AND [2] length > 2 inches (5 cm)    Negative: Can't remove live tick (after trying " Care Advice)    Negative: Can't remove tick's head that was broken off in the skin (after trying Care Advice)    Negative: [1] 2 to 14 days following tick bite AND [2] widespread rash or headache AND [3] no fever    Negative: [1] 2 to 14 days following tick bite AND [2] fever AND [3] no rash or headache    Negative: [1] Red or very tender (to touch) area AND [2] started over 24 hours after the bite    Negative: Red ring or bull's-eye rash occurs at tick bite    Negative: [1] Probable deer tick AND [2] attached > 36 hours (or tick appears swollen, not flat) AND [3] occurred in an area where Lyme disease is common    Negative: [1] Scab is present AND [2] it drains pus or increases in size AND [3] not improved after applying antibiotic ointment for 2 days    Negative: [1] Scab is present AND [2] it drains pus or increases in size    Protocols used: TICK BITE-A-

## 2022-05-02 ENCOUNTER — MYC MEDICAL ADVICE (OUTPATIENT)
Dept: FAMILY MEDICINE | Facility: CLINIC | Age: 62
End: 2022-05-02
Payer: COMMERCIAL

## 2022-05-02 NOTE — TELEPHONE ENCOUNTER
Provider Response to 2nd Level Triage Request    I have reviewed the RN documentation. My recommendation is:  E-Visit

## 2022-05-11 DIAGNOSIS — M26.609 TMJ (TEMPOROMANDIBULAR JOINT SYNDROME): ICD-10-CM

## 2022-05-11 NOTE — TELEPHONE ENCOUNTER
Routing refill request to provider for review/approval because:  Drug not on the [facility] refill protocol     Wendy ORTIZN, RN

## 2022-05-12 RX ORDER — CYCLOBENZAPRINE HCL 10 MG
TABLET ORAL
Qty: 30 TABLET | Refills: 5 | Status: SHIPPED | OUTPATIENT
Start: 2022-05-12 | End: 2022-08-15

## 2022-08-03 NOTE — PATIENT INSTRUCTIONS
Let me know when you need medication prior to pap/mammogram as you are due for these things        Preventive Health Recommendations  Female Ages 50 - 64    Yearly exam: See your health care provider every year in order to  Review health changes.   Discuss preventive care.    Review your medicines if your doctor has prescribed any.    Get a Pap test every three years (unless you have an abnormal result and your provider advises testing more often).  If you get Pap tests with HPV test, you only need to test every 5 years, unless you have an abnormal result.   You do not need a Pap test if your uterus was removed (hysterectomy) and you have not had cancer.  You should be tested each year for STDs (sexually transmitted diseases) if you're at risk.   Have a mammogram every 1 to 2 years.  Have a colonoscopy at age 50, or have a yearly FIT test (stool test). These exams screen for colon cancer.    Have a cholesterol test every 5 years, or more often if advised.  Have a diabetes test (fasting glucose) every three years. If you are at risk for diabetes, you should have this test more often.   If you are at risk for osteoporosis (brittle bone disease), think about having a bone density scan (DEXA).    Shots: Get a flu shot each year. Get a tetanus shot every 10 years.    Nutrition:   Eat at least 5 servings of fruits and vegetables each day.  Eat whole-grain bread, whole-wheat pasta and brown rice instead of white grains and rice.  Get adequate Calcium and Vitamin D.     Lifestyle  Exercise at least 150 minutes a week (30 minutes a day, 5 days a week). This will help you control your weight and prevent disease.  Limit alcohol to one drink per day.  No smoking.   Wear sunscreen to prevent skin cancer.   See your dentist every six months for an exam and cleaning.  See your eye doctor every 1 to 2 years.
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
## 2022-08-09 ENCOUNTER — DOCUMENTATION ONLY (OUTPATIENT)
Dept: LAB | Facility: CLINIC | Age: 62
End: 2022-08-09

## 2022-08-09 DIAGNOSIS — Z13.220 LIPID SCREENING: ICD-10-CM

## 2022-08-09 DIAGNOSIS — Z13.1 SCREENING FOR DIABETES MELLITUS: Primary | ICD-10-CM

## 2022-08-09 NOTE — PROGRESS NOTES
Gabriela Feliciano has an upcoming lab appointment:    Future Appointments   Date Time Provider Department Center   8/12/2022  7:00 AM [facility]   8/12/2022  2:30 PM [facility]ann[facility] Nur [facility]-C [facility]     Patient is scheduled for the following lab(s):     There is no order available. Please review and place either future orders or HMPO (Review of Health Maintenance Protocol Orders), as appropriate.    Health Maintenance Due   Topic     ANNUAL REVIEW OF HM ORDERS      Shira Muñoz

## 2022-08-12 ENCOUNTER — LAB (OUTPATIENT)
Dept: LAB | Facility: CLINIC | Age: 62
End: 2022-08-12
Payer: COMMERCIAL

## 2022-08-12 ENCOUNTER — OFFICE VISIT (OUTPATIENT)
Dept: FAMILY MEDICINE | Facility: CLINIC | Age: 62
End: 2022-08-12
Payer: COMMERCIAL

## 2022-08-12 VITALS
DIASTOLIC BLOOD PRESSURE: 82 MMHG | BODY MASS INDEX: 17 KG/M2 | RESPIRATION RATE: 16 BRPM | HEART RATE: 71 BPM | OXYGEN SATURATION: 97 % | TEMPERATURE: 97.3 F | SYSTOLIC BLOOD PRESSURE: 130 MMHG | WEIGHT: 102 LBS | HEIGHT: 65 IN

## 2022-08-12 DIAGNOSIS — Z91.09 HOUSE DUST MITE ALLERGY: ICD-10-CM

## 2022-08-12 DIAGNOSIS — M25.559 HIP PAIN: ICD-10-CM

## 2022-08-12 DIAGNOSIS — F51.01 PRIMARY INSOMNIA: ICD-10-CM

## 2022-08-12 DIAGNOSIS — Z12.31 VISIT FOR SCREENING MAMMOGRAM: ICD-10-CM

## 2022-08-12 DIAGNOSIS — Z12.4 CERVICAL CANCER SCREENING: ICD-10-CM

## 2022-08-12 DIAGNOSIS — Z13.1 SCREENING FOR DIABETES MELLITUS: ICD-10-CM

## 2022-08-12 DIAGNOSIS — B00.1 RECURRENT HERPES LABIALIS: ICD-10-CM

## 2022-08-12 DIAGNOSIS — Z00.00 ROUTINE GENERAL MEDICAL EXAMINATION AT A HEALTH CARE FACILITY: Primary | ICD-10-CM

## 2022-08-12 DIAGNOSIS — Z13.220 LIPID SCREENING: ICD-10-CM

## 2022-08-12 DIAGNOSIS — Z12.11 SCREEN FOR COLON CANCER: ICD-10-CM

## 2022-08-12 LAB
ALBUMIN SERPL-MCNC: 3.7 G/DL (ref 3.4–5)
ALP SERPL-CCNC: 61 U/L (ref 40–150)
ALT SERPL W P-5'-P-CCNC: 33 U/L (ref 0–50)
ANION GAP SERPL CALCULATED.3IONS-SCNC: 6 MMOL/L (ref 3–14)
AST SERPL W P-5'-P-CCNC: 23 U/L (ref 0–45)
BILIRUB SERPL-MCNC: 0.5 MG/DL (ref 0.2–1.3)
BUN SERPL-MCNC: 14 MG/DL (ref 7–30)
CALCIUM SERPL-MCNC: 8.7 MG/DL (ref 8.5–10.1)
CHLORIDE BLD-SCNC: 102 MMOL/L (ref 94–109)
CHOLEST SERPL-MCNC: 228 MG/DL
CO2 SERPL-SCNC: 26 MMOL/L (ref 20–32)
CREAT SERPL-MCNC: 0.71 MG/DL (ref 0.52–1.04)
FASTING STATUS PATIENT QL REPORTED: YES
GFR SERPL CREATININE-BSD FRML MDRD: >90 ML/MIN/1.73M2
GLUCOSE BLD-MCNC: 95 MG/DL (ref 70–99)
HDLC SERPL-MCNC: 71 MG/DL
LDLC SERPL CALC-MCNC: 141 MG/DL
NONHDLC SERPL-MCNC: 157 MG/DL
POTASSIUM BLD-SCNC: 3.5 MMOL/L (ref 3.4–5.3)
PROT SERPL-MCNC: 7.3 G/DL (ref 6.8–8.8)
SODIUM SERPL-SCNC: 134 MMOL/L (ref 133–144)
TRIGL SERPL-MCNC: 82 MG/DL

## 2022-08-12 PROCEDURE — 80061 LIPID PANEL: CPT

## 2022-08-12 PROCEDURE — 99214 OFFICE O/P EST MOD 30 MIN: CPT | Mod: 25 | Performed by: PHYSICIAN ASSISTANT

## 2022-08-12 PROCEDURE — 99396 PREV VISIT EST AGE 40-64: CPT | Performed by: PHYSICIAN ASSISTANT

## 2022-08-12 PROCEDURE — 80053 COMPREHEN METABOLIC PANEL: CPT

## 2022-08-12 PROCEDURE — 36415 COLL VENOUS BLD VENIPUNCTURE: CPT

## 2022-08-12 RX ORDER — MELOXICAM 15 MG/1
TABLET ORAL
Qty: 90 TABLET | Refills: 3 | Status: SHIPPED | OUTPATIENT
Start: 2022-08-12 | End: 2023-06-19

## 2022-08-12 RX ORDER — MELOXICAM 15 MG/1
TABLET ORAL
COMMUNITY
Start: 2022-06-20 | End: 2022-08-12

## 2022-08-12 RX ORDER — VALACYCLOVIR HYDROCHLORIDE 1 G/1
TABLET, FILM COATED ORAL
Qty: 20 TABLET | Refills: 6 | Status: SHIPPED | OUTPATIENT
Start: 2022-08-12 | End: 2024-02-15

## 2022-08-12 RX ORDER — ZOLPIDEM TARTRATE 5 MG/1
5 TABLET ORAL
Qty: 30 TABLET | Refills: 5 | Status: SHIPPED | OUTPATIENT
Start: 2022-08-12 | End: 2023-08-18

## 2022-08-12 RX ORDER — FLUTICASONE PROPIONATE 50 MCG
SPRAY, SUSPENSION (ML) NASAL
Qty: 16 G | Refills: 11 | Status: SHIPPED | OUTPATIENT
Start: 2022-08-12 | End: 2023-08-18

## 2022-08-12 ASSESSMENT — ENCOUNTER SYMPTOMS
HEADACHES: 0
ABDOMINAL PAIN: 0
SHORTNESS OF BREATH: 0
HEARTBURN: 0
NERVOUS/ANXIOUS: 0
JOINT SWELLING: 0
BREAST MASS: 0
DYSURIA: 0
SORE THROAT: 0
DIZZINESS: 0
DIARRHEA: 0
MYALGIAS: 0
WEAKNESS: 0
NAUSEA: 0
HEMATOCHEZIA: 0
COUGH: 0
ARTHRALGIAS: 1
CONSTIPATION: 0
CHILLS: 0
PARESTHESIAS: 0
FEVER: 0
HEMATURIA: 0
EYE PAIN: 0
FREQUENCY: 0
PALPITATIONS: 0

## 2022-08-12 ASSESSMENT — PAIN SCALES - GENERAL: PAINLEVEL: NO PAIN (0)

## 2022-08-13 ENCOUNTER — MYC MEDICAL ADVICE (OUTPATIENT)
Dept: FAMILY MEDICINE | Facility: CLINIC | Age: 62
End: 2022-08-13

## 2022-08-13 DIAGNOSIS — M26.609 TMJ (TEMPOROMANDIBULAR JOINT SYNDROME): ICD-10-CM

## 2022-08-15 RX ORDER — CYCLOBENZAPRINE HCL 10 MG
TABLET ORAL
Qty: 30 TABLET | Refills: 5 | Status: SHIPPED | OUTPATIENT
Start: 2022-08-15 | End: 2023-08-18

## 2022-09-04 PROCEDURE — 82274 ASSAY TEST FOR BLOOD FECAL: CPT | Performed by: PHYSICIAN ASSISTANT

## 2022-09-07 LAB — HEMOCCULT STL QL IA: NEGATIVE

## 2022-09-08 NOTE — RESULT ENCOUNTER NOTE
Marielos Ochoa,       Your recent test results are attached, if you have any questions or concerns please feel free to contact me via e-mail or call [phone].  Negative colon screening test.        It was a pleasure to see you at your recent office visit.      Sincerely,  Jannette Nur PA-C

## 2022-11-21 ENCOUNTER — HEALTH MAINTENANCE LETTER (OUTPATIENT)
Age: 62
End: 2022-11-21

## 2023-04-16 ENCOUNTER — HEALTH MAINTENANCE LETTER (OUTPATIENT)
Age: 63
End: 2023-04-16

## 2023-05-05 ENCOUNTER — TELEPHONE (OUTPATIENT)
Dept: FAMILY MEDICINE | Facility: CLINIC | Age: 63
End: 2023-05-05
Payer: COMMERCIAL

## 2023-05-05 NOTE — TELEPHONE ENCOUNTER
[employer] message sent to patient that Dr. Beatty is leaving [facility]-.  Oneyda Cedillo,

## 2023-05-05 NOTE — TELEPHONE ENCOUNTER
Reason for Call:  Appointment Request    Patient requesting this type of appt:  Preventive     Requested provider: Dr Allyssa Beatty     Reason patient unable to be scheduled: Not within requested timeframe    When does patient want to be seen/preferred time: Patient would like to schedule the annual on 8/18/2023 in the afternoon to establish care but there was no template to look at    Comments: Please [employer] message the patient on Dr Patel's availability.     Could we send this information to you in [employer] or would you prefer to receive a phone call?:   Patient would like to be contacted via [employer]    Call taken on 5/5/2023 at 9:12 AM by Berta Lynne

## 2023-06-19 ENCOUNTER — TELEPHONE (OUTPATIENT)
Dept: FAMILY MEDICINE | Facility: CLINIC | Age: 63
End: 2023-06-19
Payer: COMMERCIAL

## 2023-06-19 DIAGNOSIS — M25.559 HIP PAIN, UNSPECIFIED LATERALITY: ICD-10-CM

## 2023-06-19 RX ORDER — MELOXICAM 15 MG/1
TABLET ORAL
Qty: 30 TABLET | Refills: 0 | Status: SHIPPED | OUTPATIENT
Start: 2023-06-19 | End: 2023-08-18

## 2023-06-19 NOTE — TELEPHONE ENCOUNTER
Patient called to ask if a 1 month supply of the meloxicam (MOBIC) 15 MG tablet can be sent in to her pharmacy, to help cover her until she is seen for her annual physical and can establish care with a new provider, Dr. Harden.    Patient was given a 1 year supply of the Meloxicam back on 8/12/22 by previous PCP, Boom (no longer with [facility]), but is noting she has counted out her remaining pills and will run out about 7/24/23 so is asking for a short term supply to cover her for the gap till next apt.    Writer called to pharmacy to confirm, Meloxicam was last given 4/24/23 #90 tabs.  Per pharmacy, a 1 year supply is generally about a 360 day supply so often a patient is a little short of tablets.    Patient has upcoming apt with Dr. Harden on 8/18/22.  Is asking for a 30 day supply of Meloxicam to cover till apt. Current supply will run out about 7/24/23. Patient planning ahead as she knows her PCP is no longer with office.      Routing to provider team to review and advise.      Wendy Flores RN  Clinical Triage/Primary Care  [facility]

## 2023-07-24 ENCOUNTER — OFFICE VISIT (OUTPATIENT)
Dept: FAMILY MEDICINE | Facility: CLINIC | Age: 63
End: 2023-07-24
Payer: COMMERCIAL

## 2023-07-24 VITALS
OXYGEN SATURATION: 97 % | RESPIRATION RATE: 16 BRPM | DIASTOLIC BLOOD PRESSURE: 80 MMHG | HEART RATE: 74 BPM | TEMPERATURE: 97.7 F | WEIGHT: 104.8 LBS | SYSTOLIC BLOOD PRESSURE: 134 MMHG | HEIGHT: 65 IN | BODY MASS INDEX: 17.46 KG/M2

## 2023-07-24 DIAGNOSIS — H26.9 CATARACT OF BOTH EYES, UNSPECIFIED CATARACT TYPE: ICD-10-CM

## 2023-07-24 DIAGNOSIS — Z01.818 PREOP GENERAL PHYSICAL EXAM: Primary | ICD-10-CM

## 2023-07-24 DIAGNOSIS — F41.9 ANXIETY: ICD-10-CM

## 2023-07-24 DIAGNOSIS — M81.0 OSTEOPOROSIS, UNSPECIFIED OSTEOPOROSIS TYPE, UNSPECIFIED PATHOLOGICAL FRACTURE PRESENCE: ICD-10-CM

## 2023-07-24 DIAGNOSIS — F51.01 PRIMARY INSOMNIA: ICD-10-CM

## 2023-07-24 PROCEDURE — 99213 OFFICE O/P EST LOW 20 MIN: CPT | Performed by: PHYSICIAN ASSISTANT

## 2023-07-24 RX ORDER — LORAZEPAM 0.5 MG/1
0.25-0.5 TABLET ORAL EVERY 8 HOURS PRN
Qty: 3 TABLET | Refills: 0 | Status: SHIPPED | OUTPATIENT
Start: 2023-07-24

## 2023-07-24 ASSESSMENT — PAIN SCALES - GENERAL: PAINLEVEL: NO PAIN (0)

## 2023-07-24 NOTE — PROGRESS NOTES
[facility]  [address] [address]  Phone: [phone]  Primary Provider: Jannette Nur  Pre-op Performing Provider: PEPE LIN      PREOPERATIVE EVALUATION:  Today's date: 7/24/2023    Gabriela Feliciano is a 63 year old female who presents for a preoperative evaluation.       No data to display              Surgical Information:  Surgery/Procedure: Cataract Both Eyes  Surgery Location: [facility] [place]  Surgeon: Dr. Hurtado  Surgery Date: 8/8/2023, 8/22/2023  Time of Surgery: TBD  Where patient plans to recover: At home with family  Fax number for surgical facility: [phone]    Assessment & Plan     The proposed surgical procedure is considered LOW risk.      Anxiety  History of anxiety around procedures.  Patient is requesting short course of Ativan for cataract surgeries.  - LORazepam (ATIVAN) 0.5 MG tablet; Take 0.5-1 tablets (0.25-0.5 mg) by mouth every 8 hours as needed for anxiety Take 30 minutes prior to departure.  Do not operate a vehicle after taking this medication    Preop general physical exam  Here for preop for cataract bilateral eyes.    Cataract of both eyes, unspecified cataract type  History of cataracts worsening over the last year.            - No identified additional risk factors other than previously addressed    Antiplatelet or Anticoagulation Medication Instructions:   - Bleeding risk is low for this procedure (e.g. dental, skin, cataract).    Additional Medication Instructions:  Patient is to take all scheduled medications on the day of surgery    RECOMMENDATION:  APPROVAL GIVEN to proceed with proposed procedure, without further diagnostic evaluation.        Subjective       HPI related to upcoming procedure:     Last year was told that had cataract development. Has been having more cloudy vision bilaterally. No eye pain.     Prolia injection scheduled 7/28/2023 will verify with surgical team.            7/18/2023     6:08 PM   Preop Questions   1. Have you ever had a heart attack or stroke? No   2. Have you ever had surgery on your heart or blood vessels, such as a stent placement, a coronary artery bypass, or surgery on an artery in your head, neck, heart, or legs? No   3. Do you have chest pain with activity? No   4. Do you have a history of  heart failure? No   5. Do you currently have a cold, bronchitis or symptoms of other infection? No   6. Do you have a cough, shortness of breath, or wheezing? No   7. Do you or anyone in your family have previous history of blood clots? No   8. Do you or does anyone in your family have a serious bleeding problem such as prolonged bleeding following surgeries or cuts? No   9. Have you ever had problems with anemia or been told to take iron pills? No   10. Have you had any abnormal blood loss such as black, tarry or bloody stools, or abnormal vaginal bleeding? No   11. Have you ever had a blood transfusion? No   12. Are you willing to have a blood transfusion if it is medically needed before, during, or after your surgery? Yes   13. Have you or any of your relatives ever had problems with anesthesia? No   14. Do you have sleep apnea, excessive snoring or daytime drowsiness? No   15. Do you have any artifical heart valves or other implanted medical devices like a pacemaker, defibrillator, or continuous glucose monitor? No   16. Do you have artificial joints? No   17. Are you allergic to latex? No       Health Care Directive:  Patient does not have a Health Care Directive or Living Will: Discussed advance care planning with patient; information given to patient to review.    Preoperative Review of :   reviewed - controlled substances reflected in medication list.      Status of Chronic Conditions:  See problem list for active medical problems.  Problems all longstanding and stable, except as noted/documented.  See ROS for pertinent symptoms related to these  conditions.    Review of Systems  CONSTITUTIONAL: NEGATIVE for fever, chills, change in weight  INTEGUMENTARY/SKIN: NEGATIVE for worrisome rashes, moles or lesions  EYES: Hx cataracts  ENT/MOUTH: NEGATIVE for ear, mouth and throat problems  RESP: NEGATIVE for significant cough or SOB  CV: NEGATIVE for chest pain, palpitations or peripheral edema  GI: NEGATIVE for nausea, abdominal pain, heartburn, or change in bowel habits  : NEGATIVE for frequency, dysuria, or hematuria  MUSCULOSKELETAL: NEGATIVE for significant arthralgias or myalgia  NEURO: NEGATIVE for weakness, dizziness or paresthesias  ENDOCRINE: NEGATIVE for temperature intolerance, skin/hair changes  HEME: NEGATIVE for bleeding problems  PSYCHIATRIC: NEGATIVE for changes in mood or affect    Patient Active Problem List    Diagnosis Date Noted    Osteoporosis 06/19/2018     Priority: Medium    Advanced directives, counseling/discussion 04/22/2015     Priority: Medium     Information given to review.      Generalized anxiety disorder 06/26/2013     Priority: Medium     Diagnosis updated by automated process. Provider to review and confirm.      Family history of breast cancer in first degree relative 06/19/2013     Priority: Medium    Recurrent herpes labialis 07/19/2011     Priority: Medium    Hyperlipidemia LDL goal <160 07/19/2011     Priority: Medium    Post-menopausal 07/19/2011     Priority: Medium    Diagnostic skin and sensitization tests      Priority: Medium    TMJ (temporomandibular joint syndrome) 10/11/2010     Priority: Medium     [facility], Wears night splint       GERD (gastroesophageal reflux disease)      Priority: Medium    House dust mite allergy 06/07/2010     Priority: Medium      Past Medical History:   Diagnosis Date    Arthritis     Congestive heart failure (H) My father, not me    Diagnostic skin and sensitization tests 3/94 skin tests pos. for DM only--all other environemental allergens NEGATIVE     Dyslipidemia     GERD (gastroesophageal reflux disease)     Heart disease     House dust mite allergy 3/94 skin tests    only DM    Post menopausal problems     TMJ disorder     Vitamin D deficiency      Past Surgical History:   Procedure Laterality Date    NO HISTORY OF SURGERY       Current Outpatient Medications   Medication Sig Dispense Refill    calcium carbonate-vitamin D 600-200 MG-UNIT TABS Take 1 tablet by mouth daily 30 tablet 11    cetirizine (ZYRTEC) 10 MG tablet Take 1 tablet (10 mg) by mouth daily 90 tablet 11    cyclobenzaprine (FLEXERIL) 10 MG tablet TAKE 1 TABLET(10 MG) BY MOUTH THREE TIMES DAILY AS NEEDED FOR MUSCLE SPASMS 30 tablet 5    denosumab (PROLIA) 60 MG/ML SOSY injection Inject 60 mg Subcutaneous once      fluticasone (FLONASE) 50 MCG/ACT nasal spray SHAKE LIQUID AND USE 2 SPRAYS IN EACH NOSTRIL DAILY 16 g 11    LORazepam (ATIVAN) 0.5 MG tablet Take 0.5-1 tablets (0.25-0.5 mg) by mouth every 8 hours as needed for anxiety Take 30 minutes prior to departure.  Do not operate a vehicle after taking this medication 2 tablet 0    meloxicam (MOBIC) 15 MG tablet TAKE 1 TABLET BY MOUTH DAILY AS NEEDED 30 tablet 0    valACYclovir (VALTREX) 1000 mg tablet Take 2 tabs twice daily at onset of cold sore as needed. 20 tablet 6    zolpidem (AMBIEN) 5 MG tablet Take 1 tablet (5 mg) by mouth nightly as needed for sleep 30 tablet 5       Allergies   Allergen Reactions    Dust Mites     Nkda [No Known Drug Allergy]         Social History     Tobacco Use    Smoking status: Never    Smokeless tobacco: Never   Substance Use Topics    Alcohol use: Yes     Comment: glass of wine 1-3 times a week     Family History   Problem Relation Age of Onset    Hypertension Mother     Cerebrovascular Disease Mother     Heart Disease Mother          of MI age 78    Heart Failure Father     Unknown/Adopted Father         Congestive heart failure    Unknown/Adopted Maternal Grandmother     Circulatory Maternal Grandfather      "    hrafening of arteries/leg amputated    Unknown/Adopted Paternal Grandmother     Unknown/Adopted Paternal Grandfather     Obesity Brother     Cerebrovascular Disease Maternal Aunt     Cerebrovascular Disease Maternal Aunt     Breast Cancer Maternal Aunt      History   Drug Use No         Objective     /80   Pulse 74   Temp 97.7  F (36.5  C) (Tympanic)   Resp 16   Ht 1.645 m (5' 4.75\")   Wt 47.5 kg (104 lb 12.8 oz)   SpO2 97%   BMI 17.57 kg/m      Physical Exam  GENERAL APPEARANCE: healthy, alert and no distress  HENT: ear canals and TM's normal and nose and mouth without ulcers or lesions.  Normal extraocular movements.  No conjunctival injection.  RESP: lungs clear to auscultation - no rales, rhonchi or wheezes  CV: regular rate and rhythm, normal S1 S2, no S3 or S4 and no murmur, click or rub   ABDOMEN: soft, nontender, no HSM or masses and bowel sounds normal  NEURO: Normal strength and tone, sensory exam grossly normal, mentation intact and speech normal    Recent Labs   Lab Test 08/12/22  0705      POTASSIUM 3.5   CR 0.71        Diagnostics:  No labs were ordered during this visit.   No EKG required for low risk surgery (cataract, skin procedure, breast biopsy, etc).    Revised Cardiac Risk Index (RCRI):  The patient has the following serious cardiovascular risks for perioperative complications:   - No serious cardiac risks = 0 points     RCRI Interpretation: 0 points: Class I (very low risk - 0.4% complication rate)         Signed Electronically by: Jake Barber PA-C  Copy of this evaluation report is provided to requesting physician.      "

## 2023-08-11 ASSESSMENT — ENCOUNTER SYMPTOMS
COUGH: 0
WEAKNESS: 0
DYSURIA: 0
HEADACHES: 0
HEARTBURN: 1
ABDOMINAL PAIN: 0
DIARRHEA: 0
SHORTNESS OF BREATH: 0
FEVER: 0
EYE PAIN: 0
PARESTHESIAS: 0
HEMATURIA: 0
JOINT SWELLING: 0
BREAST MASS: 0
HEMATOCHEZIA: 0
CONSTIPATION: 0
CHILLS: 0
FREQUENCY: 0
ARTHRALGIAS: 1
MYALGIAS: 0
SORE THROAT: 0
DIZZINESS: 0
NAUSEA: 0
NERVOUS/ANXIOUS: 0
PALPITATIONS: 0

## 2023-08-18 ENCOUNTER — OFFICE VISIT (OUTPATIENT)
Dept: FAMILY MEDICINE | Facility: CLINIC | Age: 63
End: 2023-08-18
Payer: COMMERCIAL

## 2023-08-18 VITALS
DIASTOLIC BLOOD PRESSURE: 89 MMHG | BODY MASS INDEX: 16.96 KG/M2 | OXYGEN SATURATION: 97 % | HEART RATE: 69 BPM | SYSTOLIC BLOOD PRESSURE: 138 MMHG | WEIGHT: 101.8 LBS | HEIGHT: 65 IN | RESPIRATION RATE: 14 BRPM | TEMPERATURE: 97.4 F

## 2023-08-18 DIAGNOSIS — F51.01 PRIMARY INSOMNIA: ICD-10-CM

## 2023-08-18 DIAGNOSIS — Z91.09 HOUSE DUST MITE ALLERGY: ICD-10-CM

## 2023-08-18 DIAGNOSIS — Z12.11 SCREEN FOR COLON CANCER: ICD-10-CM

## 2023-08-18 DIAGNOSIS — M25.559 HIP PAIN, UNSPECIFIED LATERALITY: ICD-10-CM

## 2023-08-18 DIAGNOSIS — B00.1 RECURRENT HERPES LABIALIS: ICD-10-CM

## 2023-08-18 DIAGNOSIS — M26.609 TMJ (TEMPOROMANDIBULAR JOINT SYNDROME): ICD-10-CM

## 2023-08-18 DIAGNOSIS — Z00.00 ROUTINE GENERAL MEDICAL EXAMINATION AT A HEALTH CARE FACILITY: ICD-10-CM

## 2023-08-18 DIAGNOSIS — Z13.220 SCREENING FOR HYPERLIPIDEMIA: ICD-10-CM

## 2023-08-18 DIAGNOSIS — Z13.1 SCREENING FOR DIABETES MELLITUS: ICD-10-CM

## 2023-08-18 DIAGNOSIS — Z12.31 VISIT FOR SCREENING MAMMOGRAM: ICD-10-CM

## 2023-08-18 DIAGNOSIS — Z12.4 CERVICAL CANCER SCREENING: Primary | ICD-10-CM

## 2023-08-18 LAB
ANION GAP SERPL CALCULATED.3IONS-SCNC: 11 MMOL/L (ref 7–15)
BASOPHILS # BLD AUTO: 0 10E3/UL (ref 0–0.2)
BASOPHILS NFR BLD AUTO: 1 %
BUN SERPL-MCNC: 14.3 MG/DL (ref 8–23)
CALCIUM SERPL-MCNC: 9.4 MG/DL (ref 8.8–10.2)
CHLORIDE SERPL-SCNC: 100 MMOL/L (ref 98–107)
CHOLEST SERPL-MCNC: 212 MG/DL
CREAT SERPL-MCNC: 0.69 MG/DL (ref 0.51–0.95)
DEPRECATED HCO3 PLAS-SCNC: 28 MMOL/L (ref 22–29)
EOSINOPHIL # BLD AUTO: 0.3 10E3/UL (ref 0–0.7)
EOSINOPHIL NFR BLD AUTO: 4 %
ERYTHROCYTE [DISTWIDTH] IN BLOOD BY AUTOMATED COUNT: 12.4 % (ref 10–15)
GFR SERPL CREATININE-BSD FRML MDRD: >90 ML/MIN/1.73M2
GLUCOSE SERPL-MCNC: 87 MG/DL (ref 70–99)
HBA1C MFR BLD: 5.6 % (ref 0–5.6)
HCT VFR BLD AUTO: 38.7 % (ref 35–47)
HDLC SERPL-MCNC: 61 MG/DL
HGB BLD-MCNC: 13.1 G/DL (ref 11.7–15.7)
IMM GRANULOCYTES # BLD: 0 10E3/UL
IMM GRANULOCYTES NFR BLD: 0 %
LDLC SERPL CALC-MCNC: 132 MG/DL
LYMPHOCYTES # BLD AUTO: 1.7 10E3/UL (ref 0.8–5.3)
LYMPHOCYTES NFR BLD AUTO: 25 %
MCH RBC QN AUTO: 32 PG (ref 26.5–33)
MCHC RBC AUTO-ENTMCNC: 33.9 G/DL (ref 31.5–36.5)
MCV RBC AUTO: 95 FL (ref 78–100)
MONOCYTES # BLD AUTO: 0.5 10E3/UL (ref 0–1.3)
MONOCYTES NFR BLD AUTO: 7 %
NEUTROPHILS # BLD AUTO: 4.3 10E3/UL (ref 1.6–8.3)
NEUTROPHILS NFR BLD AUTO: 63 %
NONHDLC SERPL-MCNC: 151 MG/DL
PLATELET # BLD AUTO: 309 10E3/UL (ref 150–450)
POTASSIUM SERPL-SCNC: 4.2 MMOL/L (ref 3.4–5.3)
RBC # BLD AUTO: 4.09 10E6/UL (ref 3.8–5.2)
SODIUM SERPL-SCNC: 139 MMOL/L (ref 136–145)
TRIGL SERPL-MCNC: 96 MG/DL
WBC # BLD AUTO: 6.9 10E3/UL (ref 4–11)

## 2023-08-18 PROCEDURE — 83036 HEMOGLOBIN GLYCOSYLATED A1C: CPT | Performed by: INTERNAL MEDICINE

## 2023-08-18 PROCEDURE — 80061 LIPID PANEL: CPT | Performed by: INTERNAL MEDICINE

## 2023-08-18 PROCEDURE — 85025 COMPLETE CBC W/AUTO DIFF WBC: CPT | Performed by: INTERNAL MEDICINE

## 2023-08-18 PROCEDURE — 99396 PREV VISIT EST AGE 40-64: CPT | Performed by: INTERNAL MEDICINE

## 2023-08-18 PROCEDURE — 99214 OFFICE O/P EST MOD 30 MIN: CPT | Mod: 25 | Performed by: INTERNAL MEDICINE

## 2023-08-18 PROCEDURE — 80048 BASIC METABOLIC PNL TOTAL CA: CPT | Performed by: INTERNAL MEDICINE

## 2023-08-18 PROCEDURE — 36415 COLL VENOUS BLD VENIPUNCTURE: CPT | Performed by: INTERNAL MEDICINE

## 2023-08-18 RX ORDER — MELOXICAM 15 MG/1
TABLET ORAL
Qty: 90 TABLET | Refills: 3 | Status: SHIPPED | OUTPATIENT
Start: 2023-08-18 | End: 2024-09-05

## 2023-08-18 RX ORDER — FLUTICASONE PROPIONATE 50 MCG
SPRAY, SUSPENSION (ML) NASAL
Qty: 16 G | Refills: 11 | Status: SHIPPED | OUTPATIENT
Start: 2023-08-18 | End: 2024-08-23

## 2023-08-18 RX ORDER — MELOXICAM 15 MG/1
TABLET ORAL
Qty: 30 TABLET | Refills: 0 | Status: SHIPPED | OUTPATIENT
Start: 2023-08-18 | End: 2023-08-18

## 2023-08-18 RX ORDER — CYCLOBENZAPRINE HCL 10 MG
TABLET ORAL
Qty: 30 TABLET | Refills: 5 | Status: SHIPPED | OUTPATIENT
Start: 2023-08-18 | End: 2024-09-05

## 2023-08-18 RX ORDER — ZOLPIDEM TARTRATE 5 MG/1
5 TABLET ORAL
Qty: 30 TABLET | Refills: 5 | Status: SHIPPED | OUTPATIENT
Start: 2023-08-18 | End: 2024-09-05

## 2023-08-18 ASSESSMENT — ENCOUNTER SYMPTOMS
PALPITATIONS: 0
DIZZINESS: 0
JOINT SWELLING: 0
FREQUENCY: 0
DYSURIA: 0
BREAST MASS: 0
MYALGIAS: 0
DIARRHEA: 0
HEADACHES: 0
WEAKNESS: 0
NAUSEA: 0
SORE THROAT: 0
SHORTNESS OF BREATH: 0
HEMATURIA: 0
CHILLS: 0
FEVER: 0
HEMATOCHEZIA: 0
EYE PAIN: 0
ARTHRALGIAS: 1
PARESTHESIAS: 0
HEARTBURN: 1
COUGH: 0
NERVOUS/ANXIOUS: 0
ABDOMINAL PAIN: 0
CONSTIPATION: 0

## 2023-08-18 NOTE — TELEPHONE ENCOUNTER
Patient calling stating that there was some misunderstanding and today's appointment with Dr. Harden regarding the Meloxicam medication.       Patient reports historically she has received a one year prescription of Meloxicam from Jannette Nur.   Pt reports that last month there was a mix up with the pharmacy and was short one month. States that is why provider Sharon Solis sent 30 day supply to get her to Dr. Harden's appointment today 8/18.     Pt is requesting a new prescription be sent with the remaining 11 months. RN sees that on 8/12/22, meloxicam prescribed for qty of 90 with 3 refills.     RN does see provider note regarding follow up while on Meloxicam as can have long-term side effects (kidneys and BP). However, RN does not see how soon provider will like patient to follow-up and no future appointments or lab orders seen.       Provider, does patient need follow-up visit or labs before additional meloxicam refills given? If so, when would you like patient to follow-up? Pt currently has one month of the medication.       Pt will like a response through [employer].       Please advise. Thanks!    Jennifer Dubon RN    [facility]

## 2023-08-18 NOTE — PROGRESS NOTES
Assessment & Plan     Hip pain, unspecified laterality  Ongoing hip pain  She takes meloxicam as needed  We discussed about being followed up with this as it can affect kidneys and also blood pressure in the long-term  - meloxicam (MOBIC) 15 MG tablet; TAKE 1 TABLET BY MOUTH DAILY AS NEEDED    TMJ (temporomandibular joint syndrome)    - cyclobenzaprine (FLEXERIL) 10 MG tablet; TAKE 1 TABLET(10 MG) BY MOUTH THREE TIMES DAILY AS NEEDED FOR MUSCLE SPASMS    House dust mite allergy    - fluticasone (FLONASE) 50 MCG/ACT nasal spray; SHAKE LIQUID AND USE 2 SPRAYS IN EACH NOSTRIL DAILY    Primary insomnia  We discussed about the side effects of benzodiazepines  Ideally she should not be taking these however she tells me that she takes them only sparingly and has no side effects from them  - zolpidem (AMBIEN) 5 MG tablet; Take 1 tablet (5 mg) by mouth nightly as needed for sleep    Recurrent herpes labialis  Takes Valtrex as needed    Cervical cancer screening  She has not had a Pap smear since 2015  I offered her to do that today but she does not want to get it done today as she wants to see a female provider and also she likes to take some benzodiazepine before the procedure to calm her down  I told her that she needs to get this done as soon as possible  Advised her to make an appointment with a female provider or if she does not want to do that at least get a Pap smear as soon as possible next year before she turns 64    Visit for screening mammogram    - MA SCREENING DIGITAL BILAT - Future  (s+30); Future    Screen for colon cancer  She does not do colonoscopies  - Fecal colorectal cancer screen FIT - Future (S+30); Future  - Fecal colorectal cancer screen FIT - Future (S+30)    Routine general medical examination at a health care facility  She tries to exercise  Tries to eat healthy  Discussed about Tdap vaccine she refused  - Basic metabolic panel  (Ca, Cl, CO2, Creat, Gluc, K, Na, BUN); Future  - CBC with  platelets and differential; Future  - Basic metabolic panel  (Ca, Cl, CO2, Creat, Gluc, K, Na, BUN)  - CBC with platelets and differential    Screening for hyperlipidemia    - Lipid panel reflex to direct LDL Fasting; Future  - Lipid panel reflex to direct LDL Fasting    Screening for diabetes mellitus    - Hemoglobin A1c; Future  - Hemoglobin A1c      30 minutes spent by me on the date of the encounter doing chart review, history and exam, documentation and further activities per the note           Alexandru Harden MD  [facility] CORRY Ochoa is a 63 year old, presenting for the following health issues:  Physical      8/18/2023     6:58 AM   Additional Questions   Roomed by jonathan   Accompanied by self         8/18/2023     6:58 AM   Patient Reported Additional Medications   Patient reports taking the following new medications no       Healthy Habits:     Getting at least 3 servings of Calcium per day:  Yes    Bi-annual eye exam:  Yes    Dental care twice a year:  Yes    Sleep apnea or symptoms of sleep apnea:  None    Diet:  Regular (no restrictions)    Frequency of exercise:  4-5 days/week    Duration of exercise:  15-30 minutes    Taking medications regularly:  Yes    Medication side effects:  None    Additional concerns today:  No               Review of Systems   Constitutional:  Negative for chills and fever.   HENT:  Negative for congestion, ear pain, hearing loss and sore throat.    Eyes:  Negative for pain and visual disturbance.   Respiratory:  Negative for cough and shortness of breath.    Cardiovascular:  Negative for chest pain, palpitations and peripheral edema.   Gastrointestinal:  Positive for heartburn. Negative for abdominal pain, constipation, diarrhea, hematochezia and nausea.   Breasts:  Negative for tenderness, breast mass and discharge.   Genitourinary:  Negative for dysuria, frequency, genital sores, hematuria, pelvic pain, urgency, vaginal bleeding and  "vaginal discharge.   Musculoskeletal:  Positive for arthralgias. Negative for joint swelling and myalgias.   Skin:  Negative for rash.   Neurological:  Negative for dizziness, weakness, headaches and paresthesias.   Psychiatric/Behavioral:  Negative for mood changes. The patient is not nervous/anxious.             Objective    /89 (BP Location: Left arm, Patient Position: Sitting, Cuff Size: Adult Regular)   Pulse 69   Temp 97.4  F (36.3  C) (Oral)   Resp 14   Ht 1.645 m (5' 4.75\")   Wt 46.2 kg (101 lb 12.8 oz)   SpO2 97%   BMI 17.07 kg/m    Body mass index is 17.07 kg/m .  Physical Exam   GENERAL: healthy, alert and no distress  NECK: no adenopathy, no asymmetry, masses, or scars and thyroid normal to palpation  RESP: lungs clear to auscultation - no rales, rhonchi or wheezes  CV: regular rate and rhythm, normal S1 S2, no S3 or S4, no murmur, click or rub, no peripheral edema and peripheral pulses strong  ABDOMEN: soft, nontender, no hepatosplenomegaly, no masses and bowel sounds normal  MS: no gross musculoskeletal defects noted, no edema  NEURO: Normal strength and tone, mentation intact and speech normal  PSYCH: mentation appears normal, affect normal/bright                      "

## 2023-08-22 PROCEDURE — 82274 ASSAY TEST FOR BLOOD FECAL: CPT | Performed by: INTERNAL MEDICINE

## 2023-08-23 LAB — HEMOCCULT STL QL IA: NEGATIVE

## 2023-09-28 ENCOUNTER — TELEPHONE (OUTPATIENT)
Dept: FAMILY MEDICINE | Facility: CLINIC | Age: 63
End: 2023-09-28
Payer: COMMERCIAL

## 2023-09-28 NOTE — TELEPHONE ENCOUNTER
Patient Quality Outreach    Patient is due for the following:   Breast Cancer Screening - Mammogram  Cervical Cancer Screening - PAP Needed    Next Steps:   Schedule a office visit for mammogram    Type of outreach:    Sent [employer] message.    Next Steps:  Reach out within 90 days via [employer].    Max number of attempts reached: Yes. Will try again in 90 days if patient still on fail list.    Questions for provider review:    None           Mehnaz Cope MA  Chart routed to Provider.

## 2024-02-02 ENCOUNTER — MYC MEDICAL ADVICE (OUTPATIENT)
Dept: FAMILY MEDICINE | Facility: CLINIC | Age: 64
End: 2024-02-02

## 2024-02-02 DIAGNOSIS — B00.1 RECURRENT HERPES LABIALIS: ICD-10-CM

## 2024-02-02 RX ORDER — VALACYCLOVIR HYDROCHLORIDE 1 G/1
TABLET, FILM COATED ORAL
Qty: 20 TABLET | Refills: 6 | OUTPATIENT
Start: 2024-02-02

## 2024-02-15 RX ORDER — VALACYCLOVIR HYDROCHLORIDE 1 G/1
TABLET, FILM COATED ORAL
Qty: 20 TABLET | Refills: 6 | Status: SHIPPED | OUTPATIENT
Start: 2024-02-15 | End: 2024-09-05

## 2024-02-15 NOTE — TELEPHONE ENCOUNTER
Patient called to follow up on her request for Valtrex.    Her renewal was refused but patient was not contacted to inform her she needed a visit.     Patient states at her physical, she discussed all her medications. She notes cold sores never go away and it is nice to have the medication available to refill when a flare up occurs.     Routing to provider to review and advise if it is possible to refill Valtrex with additional refills incase of a future flare up. Thank you.    Radha Navarro, DIANAN, RN  [facility]

## 2024-08-23 ENCOUNTER — MYC MEDICAL ADVICE (OUTPATIENT)
Dept: FAMILY MEDICINE | Facility: CLINIC | Age: 64
End: 2024-08-23
Payer: COMMERCIAL

## 2024-08-23 DIAGNOSIS — Z13.1 SCREENING FOR DIABETES MELLITUS: Primary | ICD-10-CM

## 2024-08-23 DIAGNOSIS — Z13.220 SCREENING FOR HYPERLIPIDEMIA: ICD-10-CM

## 2024-08-23 DIAGNOSIS — Z00.00 ROUTINE GENERAL MEDICAL EXAMINATION AT A HEALTH CARE FACILITY: ICD-10-CM

## 2024-08-23 DIAGNOSIS — Z91.09 HOUSE DUST MITE ALLERGY: ICD-10-CM

## 2024-08-23 RX ORDER — FLUTICASONE PROPIONATE 50 MCG
SPRAY, SUSPENSION (ML) NASAL
Qty: 16 G | Refills: 11 | Status: SHIPPED | OUTPATIENT
Start: 2024-08-23 | End: 2024-09-05

## 2024-08-28 ENCOUNTER — PRE VISIT (OUTPATIENT)
Dept: UROLOGY | Facility: CLINIC | Age: 64
End: 2024-08-28
Payer: COMMERCIAL

## 2024-08-28 NOTE — TELEPHONE ENCOUNTER
Reason for visit: Incontinence     Relevant information: Self referred. History of MARIAH.     Records/imaging/labs/orders: all records available    Pt called: No need for a call    At Rooming: Have patient empty their bladder and PVR. Get a urine sample and dip the urine if they have UTI symptoms.    Maria Elena Weems  8/28/2024  2:42 PM

## 2024-08-31 ASSESSMENT — SOCIAL DETERMINANTS OF HEALTH (SDOH): HOW OFTEN DO YOU GET TOGETHER WITH FRIENDS OR RELATIVES?: ONCE A WEEK

## 2024-09-04 ENCOUNTER — LAB (OUTPATIENT)
Dept: LAB | Facility: CLINIC | Age: 64
End: 2024-09-04
Payer: COMMERCIAL

## 2024-09-04 DIAGNOSIS — Z13.1 SCREENING FOR DIABETES MELLITUS: ICD-10-CM

## 2024-09-04 DIAGNOSIS — Z13.220 SCREENING FOR HYPERLIPIDEMIA: ICD-10-CM

## 2024-09-04 DIAGNOSIS — Z00.00 ROUTINE GENERAL MEDICAL EXAMINATION AT A HEALTH CARE FACILITY: ICD-10-CM

## 2024-09-04 LAB
ANION GAP SERPL CALCULATED.3IONS-SCNC: 7 MMOL/L (ref 7–15)
BASOPHILS # BLD AUTO: 0.1 10E3/UL (ref 0–0.2)
BASOPHILS NFR BLD AUTO: 1 %
BUN SERPL-MCNC: 13.5 MG/DL (ref 8–23)
CALCIUM SERPL-MCNC: 9.3 MG/DL (ref 8.8–10.4)
CHLORIDE SERPL-SCNC: 101 MMOL/L (ref 98–107)
CHOLEST SERPL-MCNC: 264 MG/DL
CREAT SERPL-MCNC: 0.76 MG/DL (ref 0.51–0.95)
EGFRCR SERPLBLD CKD-EPI 2021: 86 ML/MIN/1.73M2
EOSINOPHIL # BLD AUTO: 0.3 10E3/UL (ref 0–0.7)
EOSINOPHIL NFR BLD AUTO: 5 %
ERYTHROCYTE [DISTWIDTH] IN BLOOD BY AUTOMATED COUNT: 12.3 % (ref 10–15)
FASTING STATUS PATIENT QL REPORTED: YES
FASTING STATUS PATIENT QL REPORTED: YES
GLUCOSE SERPL-MCNC: 101 MG/DL (ref 70–99)
HBA1C MFR BLD: 5.3 % (ref 0–5.6)
HCO3 SERPL-SCNC: 30 MMOL/L (ref 22–29)
HCT VFR BLD AUTO: 41 % (ref 35–47)
HDLC SERPL-MCNC: 82 MG/DL
HGB BLD-MCNC: 13.8 G/DL (ref 11.7–15.7)
IMM GRANULOCYTES # BLD: 0 10E3/UL
IMM GRANULOCYTES NFR BLD: 0 %
LDLC SERPL CALC-MCNC: 166 MG/DL
LYMPHOCYTES # BLD AUTO: 2 10E3/UL (ref 0.8–5.3)
LYMPHOCYTES NFR BLD AUTO: 32 %
MCH RBC QN AUTO: 32 PG (ref 26.5–33)
MCHC RBC AUTO-ENTMCNC: 33.7 G/DL (ref 31.5–36.5)
MCV RBC AUTO: 95 FL (ref 78–100)
MONOCYTES # BLD AUTO: 0.4 10E3/UL (ref 0–1.3)
MONOCYTES NFR BLD AUTO: 7 %
NEUTROPHILS # BLD AUTO: 3.5 10E3/UL (ref 1.6–8.3)
NEUTROPHILS NFR BLD AUTO: 55 %
NONHDLC SERPL-MCNC: 182 MG/DL
PLATELET # BLD AUTO: 304 10E3/UL (ref 150–450)
POTASSIUM SERPL-SCNC: 3.7 MMOL/L (ref 3.4–5.3)
RBC # BLD AUTO: 4.31 10E6/UL (ref 3.8–5.2)
SODIUM SERPL-SCNC: 138 MMOL/L (ref 135–145)
TRIGL SERPL-MCNC: 79 MG/DL
WBC # BLD AUTO: 6.3 10E3/UL (ref 4–11)

## 2024-09-04 PROCEDURE — 80061 LIPID PANEL: CPT | Mod: GZ

## 2024-09-04 PROCEDURE — 83036 HEMOGLOBIN GLYCOSYLATED A1C: CPT

## 2024-09-04 PROCEDURE — 80048 BASIC METABOLIC PNL TOTAL CA: CPT

## 2024-09-04 PROCEDURE — 36415 COLL VENOUS BLD VENIPUNCTURE: CPT

## 2024-09-04 PROCEDURE — 85025 COMPLETE CBC W/AUTO DIFF WBC: CPT | Mod: GZ

## 2024-09-05 ENCOUNTER — OFFICE VISIT (OUTPATIENT)
Dept: FAMILY MEDICINE | Facility: CLINIC | Age: 64
End: 2024-09-05
Payer: COMMERCIAL

## 2024-09-05 VITALS
DIASTOLIC BLOOD PRESSURE: 80 MMHG | SYSTOLIC BLOOD PRESSURE: 130 MMHG | OXYGEN SATURATION: 98 % | HEART RATE: 75 BPM | RESPIRATION RATE: 14 BRPM | WEIGHT: 102.4 LBS | BODY MASS INDEX: 17.06 KG/M2 | HEIGHT: 65 IN

## 2024-09-05 DIAGNOSIS — Z91.09 HOUSE DUST MITE ALLERGY: ICD-10-CM

## 2024-09-05 DIAGNOSIS — Z00.00 MEDICARE ANNUAL WELLNESS VISIT, INITIAL: Primary | ICD-10-CM

## 2024-09-05 DIAGNOSIS — B00.1 RECURRENT HERPES LABIALIS: ICD-10-CM

## 2024-09-05 DIAGNOSIS — M25.551 HIP PAIN, RIGHT: ICD-10-CM

## 2024-09-05 DIAGNOSIS — M26.609 TMJ (TEMPOROMANDIBULAR JOINT SYNDROME): ICD-10-CM

## 2024-09-05 DIAGNOSIS — Z12.11 SCREEN FOR COLON CANCER: ICD-10-CM

## 2024-09-05 DIAGNOSIS — F51.01 PRIMARY INSOMNIA: ICD-10-CM

## 2024-09-05 DIAGNOSIS — M25.559 HIP PAIN, UNSPECIFIED LATERALITY: ICD-10-CM

## 2024-09-05 PROCEDURE — G0009 ADMIN PNEUMOCOCCAL VACCINE: HCPCS | Mod: GZ | Performed by: INTERNAL MEDICINE

## 2024-09-05 PROCEDURE — 99214 OFFICE O/P EST MOD 30 MIN: CPT | Mod: 25 | Performed by: INTERNAL MEDICINE

## 2024-09-05 PROCEDURE — 90662 IIV NO PRSV INCREASED AG IM: CPT | Mod: GZ | Performed by: INTERNAL MEDICINE

## 2024-09-05 PROCEDURE — G0008 ADMIN INFLUENZA VIRUS VAC: HCPCS | Mod: GZ | Performed by: INTERNAL MEDICINE

## 2024-09-05 PROCEDURE — 90677 PCV20 VACCINE IM: CPT | Mod: GZ | Performed by: INTERNAL MEDICINE

## 2024-09-05 PROCEDURE — G0402 INITIAL PREVENTIVE EXAM: HCPCS | Performed by: INTERNAL MEDICINE

## 2024-09-05 RX ORDER — FLUTICASONE PROPIONATE 50 MCG
SPRAY, SUSPENSION (ML) NASAL
Qty: 16 G | Refills: 11 | Status: SHIPPED | OUTPATIENT
Start: 2024-09-05

## 2024-09-05 RX ORDER — CYCLOBENZAPRINE HCL 10 MG
TABLET ORAL
Qty: 30 TABLET | Refills: 5 | Status: SHIPPED | OUTPATIENT
Start: 2024-09-05

## 2024-09-05 RX ORDER — VALACYCLOVIR HYDROCHLORIDE 1 G/1
TABLET, FILM COATED ORAL
Qty: 20 TABLET | Refills: 6 | Status: SHIPPED | OUTPATIENT
Start: 2024-09-05

## 2024-09-05 RX ORDER — ZOLPIDEM TARTRATE 5 MG/1
5 TABLET ORAL
Qty: 30 TABLET | Refills: 5 | Status: SHIPPED | OUTPATIENT
Start: 2024-09-05

## 2024-09-05 RX ORDER — MELOXICAM 15 MG/1
TABLET ORAL
Qty: 90 TABLET | Refills: 3 | Status: SHIPPED | OUTPATIENT
Start: 2024-09-05

## 2024-09-05 ASSESSMENT — PAIN SCALES - GENERAL: PAINLEVEL: NO PAIN (0)

## 2024-09-05 NOTE — PROGRESS NOTES
The 10-year ASCVD risk score (Carmelita BLANKENSHIP, et al., 2019) is: 7.2%    Values used to calculate the score:      Age: 64 years      Sex: Female      Is Non- : No      Diabetic: No      Tobacco smoker: No      Systolic Blood Pressure: 148 mmHg      Is BP treated: No      HDL Cholesterol: 82 mg/dL      Total Cholesterol: 264 mg/dL     Preventive Care Visit  [facility] CORRY Harden MD, Internal Medicine  Sep 5, 2024      Assessment & Plan     Medicare annual wellness visit, initial  Discussed about mammograms  She does not want any mammograms  Encouraged her to get mammograms as she has a family history of breast cancer   She does not want to get Pap smears  She last Pap smear in 2015  I advised her to get a Pap smear last year but she tells me that she forgot about it  She is due for a tetanus/RSV/COVID booster/influenza/pneumococcal vaccination  We will get the pneumococcal/influenza vaccine today  Up-to-date with shingles vaccine  She has osteoporosis and follows up with endocrinologist and gets Prolia injections  Screen for colon cancer  She does not want to do colonoscopy  - Fecal colorectal cancer screen FIT - Future (S+30); Future  - Fecal colorectal cancer screen FIT - Future (S+30)    House dust mite allergy    - fluticasone (FLONASE) 50 MCG/ACT nasal spray; SHAKE LIQUID AND USE 2 SPRAYS IN EACH NOSTRIL DAILY    TMJ (temporomandibular joint syndrome)    - cyclobenzaprine (FLEXERIL) 10 MG tablet; TAKE 1 TABLET(10 MG) BY MOUTH THREE TIMES DAILY AS NEEDED FOR MUSCLE SPASMS    Hip pain, unspecified laterality  She has ongoing hip pain  She was recommended hip replacement by [facility] but she is not keen on that she continues to take Mobic  Advised her not to take any more ibuprofen when she is taking Mobic but she can take Tylenol  Discussed about gabapentin  She is going to look into it  - meloxicam (MOBIC) 15 MG tablet; TAKE 1 TABLET BY MOUTH  DAILY AS NEEDED    Recurrent herpes labialis    - valACYclovir (VALTREX) 1000 mg tablet; Take 2 tabs twice daily at onset of cold sore as needed.  - OFFICE/OUTPT VISIT,EST,LEVL IV    Primary insomnia    - zolpidem (AMBIEN) 5 MG tablet; Take 1 tablet (5 mg) by mouth nightly as needed for sleep.  - OFFICE/OUTPT VISIT,EST,LEVL IV    Hip pain, right  As above advised her not to take any ibuprofen on top of meloxicam but she can take some Tylenol and also we discussed about acupuncture/physical therapy  - diclofenac (VOLTAREN) 1 % topical gel; Apply 2 g topically 4 times daily.  - OFFICE/OUTPT VISIT,EST,LEVL IV    Patient has been advised of split billing requirements and indicates understanding: No        Counseling  Appropriate preventive services were addressed with this patient via screening, questionnaire, or discussion as appropriate for fall prevention, nutrition, physical activity, Tobacco-use cessation, social engagement, weight loss and cognition.  Checklist reviewing preventive services available has been given to the patient.  Reviewed patient's diet, addressing concerns and/or questions.   The patient was provided with written information regarding signs of hearing loss.   Information on urinary incontinence and treatment options given to patient.           Raysa Ochoa is a 64 year old, presenting for the following:  Physical        9/5/2024    12:27 PM   Additional Questions   Roomed by spike        Health Care Directive  Patient does not have a Health Care Directive or Living Will: Discussed advance care planning with patient; however, patient declined at this time.    HPI  Here for annual physical and discuss other issues            8/31/2024   General Health   How would you rate your overall physical health? Good   Feel stress (tense, anxious, or unable to sleep) To some extent      (!) STRESS CONCERN      8/31/2024   Nutrition   Diet: Regular (no restrictions)            8/31/2024   Exercise    Days per week of moderate/strenous exercise 5 days   Average minutes spent exercising at this level 30 min            8/31/2024   Social Factors   Frequency of gathering with friends or relatives Once a week   Worry food won't last until get money to buy more No   Food not last or not have enough money for food? No   Do you have housing? (Housing is defined as stable permanent housing and does not include staying ouside in a car, in a tent, in an abandoned building, in an overnight shelter, or couch-surfing.) Yes   Are you worried about losing your housing? No   Lack of transportation? No   Unable to get utilities (heat,electricity)? No            9/5/2024   Fall Risk   Gait Speed Test (Document in seconds) 2   Gait Speed Test Interpretation Less than or equal to 5.00 seconds - PASS             8/31/2024   Activities of Daily Living- Home Safety   Needs help with the following daily activites None of the above   Safety concerns in the home None of the above            8/31/2024   Dental   Dentist two times every year? Yes            8/31/2024   Hearing Screening   Hearing concerns? (!) I FEEL THAT PEOPLE ARE MUMBLING OR NOT SPEAKING CLEARLY.    (!) IT'S HARD TO FOLLOW A CONVERSATION IN A NOISY RESTAURANT OR CROWDED ROOM.       Multiple values from one day are sorted in reverse-chronological order         8/31/2024   Driving Risk Screening   Patient/family members have concerns about driving No            8/31/2024   General Alertness/Fatigue Screening   Have you been more tired than usual lately? No            8/31/2024   Urinary Incontinence Screening   Bothered by leaking urine in past 6 months Yes            8/31/2024   TB Screening   Were you born outside of the [place]? No            Today's PHQ-2 Score:       9/5/2024    12:30 PM   PHQ-2 ( 1999 [employer])   Q1: Little interest or pleasure in doing things 0   Q2: Feeling down, depressed or hopeless 0   PHQ-2 Score 0   Q1: Little interest or pleasure in doing things  Not at all   Q2: Feeling down, depressed or hopeless Not at all   PHQ-2 Score 0           2024   Substance Use   Alcohol more than 3/day or more than 7/wk No   Do you have a current opioid prescription? No   How severe/bad is pain from 1 to 10? 3/10   Do you use any other substances recreationally? No        Social History     Tobacco Use    Smoking status: Never    Smokeless tobacco: Never   Vaping Use    Vaping status: Never Used   Substance Use Topics    Alcohol use: Yes     Comment: glass of wine 1-3 times a week    Drug use: No                History of abnormal Pap smear: Patient has declined Pap smear        Latest Ref Rng & Units 2015     3:07 PM 2015    12:00 AM 2011     5:32 PM   PAP / HPV   PAP (Historical)   NIL  NIL    HPV 16 DNA NEG Negative      HPV 18 DNA NEG Negative      Other HR HPV NEG Negative        ASCVD Risk   The 10-year ASCVD risk score (Carmelita BLANKENSHIP, et al., 2019) is: 7.2%    Values used to calculate the score:      Age: 64 years      Sex: Female      Is Non- : No      Diabetic: No      Tobacco smoker: No      Systolic Blood Pressure: 148 mmHg      Is BP treated: No      HDL Cholesterol: 82 mg/dL      Total Cholesterol: 264 mg/dL    Fracture Risk Assessment Tool  Link to Frax Calculator  Use the information below to complete the Frax calculator  : 1960  Sex: female  Weight (kg): 46.4 kg (actual weight)  Height (cm): 163.8 cm  Previous Fragility Fracture:  No  History of parent with fractured hip:  No  Current Smoking:  No  Patient has been on glucocorticoids for more than 3 months (5mg/day or more): No  Rheumatoid Arthritis on Problem List:  No  Secondary Osteoporosis on Problem List:  No  Consumes 3 or more units of alcohol per day: No  Femoral Neck BMD (g/cm2)            Reviewed and updated as needed this visit by Provider                      Current providers sharing in care for this patient include:  Patient Care Team:  Dereck  "MD Alexandru as PCP - General (Internal Medicine)  Alexandru Harden MD as Assigned PCP  Darlene Gonzales MD as MD (Urology)    The following health maintenance items are reviewed in Epic and correct as of today:  Health Maintenance   Topic Date Due    RSV VACCINE (1 - 1-dose 60+ series) Never done    MAMMO SCREENING  11/06/2020    DTAP/TDAP/TD IMMUNIZATION (2 - Td or Tdap) 08/01/2022    ANNUAL REVIEW OF HM ORDERS  08/18/2024    COLORECTAL CANCER SCREENING  08/22/2024    INFLUENZA VACCINE (1) 09/01/2024    COVID-19 Vaccine (3 - 2023-24 season) 09/01/2024    Pneumococcal Vaccine: 65+ Years (1 of 1 - PCV) 06/13/2024    MEDICARE ANNUAL WELLNESS VISIT  08/18/2024    LIPID  09/04/2025    FALL RISK ASSESSMENT  09/05/2025    GLUCOSE  09/04/2027    ADVANCE CARE PLANNING  07/24/2028    DEXA  04/16/2033    HEPATITIS C SCREENING  Completed    HIV SCREENING  Completed    PHQ-2 (once per calendar year)  Completed    ZOSTER IMMUNIZATION  Completed    HPV IMMUNIZATION  Aged Out    MENINGITIS IMMUNIZATION  Aged Out    RSV MONOCLONAL ANTIBODY  Aged Out    PAP  Discontinued         Review of Systems  Constitutional, HEENT, cardiovascular, pulmonary, gi and gu systems are negative, except as otherwise noted.     Objective    Exam  BP (!) 148/78   Pulse 75   Resp 14   Ht 1.638 m (5' 4.5\")   Wt 46.4 kg (102 lb 6.4 oz)   SpO2 98%   BMI 17.31 kg/m     Estimated body mass index is 17.31 kg/m  as calculated from the following:    Height as of this encounter: 1.638 m (5' 4.5\").    Weight as of this encounter: 46.4 kg (102 lb 6.4 oz).    Physical Exam  GENERAL: alert and no distress  EYES: Eyes grossly normal to inspection, PERRL and conjunctivae and sclerae normal  HENT: ear canals and TM's normal, nose and mouth without ulcers or lesions  NECK: no adenopathy, no asymmetry, masses, or scars  RESP: lungs clear to auscultation - no rales, rhonchi or wheezes  CV: regular rate and rhythm, normal S1 S2, no S3 or S4, no murmur, click " or rub, no peripheral edema  ABDOMEN: soft, nontender, no hepatosplenomegaly, no masses and bowel sounds normal  MS: no gross musculoskeletal defects noted, no edema  SKIN: no suspicious lesions or rashes  NEURO: Normal strength and tone, mentation intact and speech normal  PSYCH: mentation appears normal, affect normal/bright        9/5/2024   Mini Cog   Clock Draw Score 2 Normal   3 Item Recall 3 objects recalled   Mini Cog Total Score 5            Vision Screen  Vision Screen Results: Pass      Signed Electronically by: Alexandru Harden MD

## 2024-09-09 PROCEDURE — 82274 ASSAY TEST FOR BLOOD FECAL: CPT | Performed by: INTERNAL MEDICINE

## 2024-09-16 ENCOUNTER — PRE VISIT (OUTPATIENT)
Dept: UROLOGY | Facility: CLINIC | Age: 64
End: 2024-09-16
Payer: COMMERCIAL

## 2024-09-16 LAB — HEMOCCULT STL QL IA: NEGATIVE

## 2024-09-16 NOTE — TELEPHONE ENCOUNTER
Reason for visit: Incontinence                 Relevant information: Self referred. History of MARIAH.      Records/imaging/labs/orders: all records available     Pt called: No need for a call     At Rooming: Have patient empty their bladder and PVR. Get a urine sample and dip the urine if they have UTI symptoms.          Maria Elena Weems  9/16/2024  5:20 PM

## 2024-09-17 ENCOUNTER — MYC MEDICAL ADVICE (OUTPATIENT)
Dept: FAMILY MEDICINE | Facility: CLINIC | Age: 64
End: 2024-09-17
Payer: COMMERCIAL

## 2024-09-18 NOTE — TELEPHONE ENCOUNTER
MEDICAL RECORDS REQUEST   [facility]  Urology Clinic  [address] [place] [address]  PHONE: [phone]  Fax: [phone]        FUTURE VISIT INFORMATION                                                   Gabriela Feliciano, : 1960 scheduled for future visit at [facility]    APPOINTMENT INFORMATION:  Date: 24  Provider:  Dr. Darlene Gonzales  Reason for Visit/Diagnosis: Incontinence      RECORDS REQUESTED FOR VISIT                                                     NOTES  STATUS/DETAILS   OFFICE NOTE from referring provider  self   MEDICATION LIST  yes   LABS     URINALYSIS (UA)  yes 11   URINE CYTOLOGY  no     PRE-VISIT CHECKLIST      Joint diagnostic appointment coordinated correctly          (ensure right order & amount of time) Yes   RECORD COLLECTION COMPLETE Yes

## 2024-09-20 ENCOUNTER — OFFICE VISIT (OUTPATIENT)
Dept: UROLOGY | Facility: CLINIC | Age: 64
End: 2024-09-20
Payer: COMMERCIAL

## 2024-09-20 ENCOUNTER — PRE VISIT (OUTPATIENT)
Dept: UROLOGY | Facility: CLINIC | Age: 64
End: 2024-09-20

## 2024-09-20 VITALS
SYSTOLIC BLOOD PRESSURE: 171 MMHG | OXYGEN SATURATION: 95 % | RESPIRATION RATE: 12 BRPM | DIASTOLIC BLOOD PRESSURE: 114 MMHG | WEIGHT: 102 LBS | BODY MASS INDEX: 17 KG/M2 | HEART RATE: 77 BPM | HEIGHT: 65 IN

## 2024-09-20 DIAGNOSIS — N39.42 URINARY INCONTINENCE WITHOUT SENSORY AWARENESS: Primary | ICD-10-CM

## 2024-09-20 PROCEDURE — 99202 OFFICE O/P NEW SF 15 MIN: CPT | Mod: 25 | Performed by: UROLOGY

## 2024-09-20 PROCEDURE — 51798 US URINE CAPACITY MEASURE: CPT | Performed by: UROLOGY

## 2024-09-20 ASSESSMENT — PAIN SCALES - GENERAL: PAINLEVEL: NO PAIN (0)

## 2024-09-20 NOTE — PATIENT INSTRUCTIONS
Websites with free information:    [employer] patient website: www.[email or web address]    Total Control Program: www.totalcontrolprogram.com    Please see one of the dedicated pelvic floor physical therapist. If you have not heard from the scheduling office within 2 business days, please call [phone] for [employer]    Please return to see SURY in 6 months, sooner if needed    It was a pleasure meeting with you today.  Thank you for allowing me and my team the privilege of caring for you today.  YOU are the reason we are here, and I truly hope we provided you with the excellent service you deserve.  Please let us know if there is anything else we can do for you so that we can be sure you are leaving completely satisfied with your care experience.

## 2024-09-20 NOTE — NURSING NOTE
"Chief Complaint   Patient presents with    Incontinence     Noticing leakage in the morning while relaxing, not during physical activity.        Blood pressure (!) 171/114, pulse 77, resp. rate 12, height 1.638 m (5' 4.5\"), weight 46.3 kg (102 lb), SpO2 95%, not currently breastfeeding. Body mass index is 17.24 kg/m .    Patient Active Problem List   Diagnosis    House dust mite allergy    GERD (gastroesophageal reflux disease)    TMJ (temporomandibular joint syndrome)    Diagnostic skin and sensitization tests    Recurrent herpes labialis    Hyperlipidemia LDL goal <160    Post-menopausal    Family history of breast cancer in first degree relative    Generalized anxiety disorder    Osteoporosis       Allergies   Allergen Reactions    Dust Mites     Nkda [No Known Drug Allergy]        Current Outpatient Medications   Medication Sig Dispense Refill    calcium carbonate-vitamin D 600-200 MG-UNIT TABS Take 1 tablet by mouth daily 30 tablet 11    cetirizine (ZYRTEC) 10 MG tablet Take 1 tablet (10 mg) by mouth daily 90 tablet 11    cyclobenzaprine (FLEXERIL) 10 MG tablet TAKE 1 TABLET(10 MG) BY MOUTH THREE TIMES DAILY AS NEEDED FOR MUSCLE SPASMS 30 tablet 5    denosumab (PROLIA) 60 MG/ML SOSY injection Inject 60 mg Subcutaneous once      diclofenac (VOLTAREN) 1 % topical gel Apply 2 g topically 4 times daily. 150 g 1    fluticasone (FLONASE) 50 MCG/ACT nasal spray SHAKE LIQUID AND USE 2 SPRAYS IN EACH NOSTRIL DAILY 16 g 11    LORazepam (ATIVAN) 0.5 MG tablet Take 0.5-1 tablets (0.25-0.5 mg) by mouth every 8 hours as needed for anxiety Take 30 minutes prior to departure.  Do not operate a vehicle after taking this medication 3 tablet 0    meloxicam (MOBIC) 15 MG tablet TAKE 1 TABLET BY MOUTH DAILY AS NEEDED 90 tablet 3    valACYclovir (VALTREX) 1000 mg tablet Take 2 tabs twice daily at onset of cold sore as needed. 20 tablet 6    zolpidem (AMBIEN) 5 MG tablet Take 1 tablet (5 mg) by mouth nightly as needed for sleep. 30 " tablet 5       Social History     Tobacco Use    Smoking status: Never    Smokeless tobacco: Never   Vaping Use    Vaping status: Never Used   Substance Use Topics    Alcohol use: Yes     Comment: glass of wine 1-3 times a week    Drug use: No       Maria Elena Weems  9/20/2024  2:14 PM

## 2024-09-20 NOTE — LETTER
9/20/2024       RE: Gabriela Feliciano  [address]  [facility] [address]     Dear Colleague,    Thank you for referring your patient, Gabriela Feliciano, to the [facility] [place] [facility]. Please see a copy of my visit note below.    September 20, 2024    Referring Provider: Referred Self, MD  No address on file    Primary Care Provider: Alexandru Harden    Assessment & Plan    Urinary incontinence without sensory awareness  New onset but given that the urine leakage is small volume and not consistent I have recommended she start with pelvic floor physical therapy.  We discussed how this works and she is agreeable  - POST-VOID RESIDUAL BLADDER SCAN  - Physical Therapy  Referral; Future    RTC 6 months to see Berenice in [place], sooner if needed    10 minutes were spent on this day of the encounter in reviewing the EMR including, direct patient care, coordination of care and documentation    Darlene Gonzales MD MPH  (she/her/hers)   of Urology  [facility]      HPI:  Gabriela Feliciano is a 64 year old female who presents for evaluation of her pelvic floor symptoms.  She states that back in June that she notices a little leakage, very small volumes, every morning but not noticing rest of day.   Now wears a liner, but notes usually dry    Denies gross hematuria, vaginal bleeding, UTI, vaginal bulge, constipation, dyspareunia.    Denies pelvic surgery    Past Medical History:   Diagnosis Date     Arthritis      Congestive heart failure (H) My father, not me     Diagnostic skin and sensitization tests 3/94 skin tests pos. for DM only--all other environemental allergens NEGATIVE     Dyslipidemia      GERD (gastroesophageal reflux disease)      Heart disease      House dust mite allergy 3/94 skin tests    only DM     Post menopausal problems      TMJ disorder      Vitamin D deficiency      Past Surgical  History:   Procedure Laterality Date     NO HISTORY OF SURGERY       Social History     Socioeconomic History     Marital status:      Spouse name: Not on file     Number of children: Not on file     Years of education: Not on file     Highest education level: Not on file   Occupational History     Not on file   Tobacco Use     Smoking status: Never     Smokeless tobacco: Never   Vaping Use     Vaping status: Never Used   Substance and Sexual Activity     Alcohol use: Yes     Comment: glass of wine 1-3 times a week     Drug use: No     Sexual activity: Yes     Partners: Male     Birth control/protection: Post-menopausal, None     Comment: not an issue at my age   Other Topics Concern     Parent/sibling w/ CABG, MI or angioplasty before 65F 55M? No      Service No     Blood Transfusions No     Caffeine Concern No     Occupational Exposure No     Hobby Hazards No     Sleep Concern No     Stress Concern No     Weight Concern No     Special Diet No     Back Care No     Exercise Yes     Bike Helmet Yes     Seat Belt Yes     Self-Exams Yes   Social History Narrative     Not on file     Social Determinants of Health     Financial Resource Strain: Low Risk  (8/31/2024)    Financial Resource Strain      Within the past 12 months, have you or your family members you live with been unable to get utilities (heat, electricity) when it was really needed?: No   Food Insecurity: Low Risk  (8/31/2024)    Food Insecurity      Within the past 12 months, did you worry that your food would run out before you got money to buy more?: No      Within the past 12 months, did the food you bought just not last and you didn t have money to get more?: No   Transportation Needs: Low Risk  (8/31/2024)    Transportation Needs      Within the past 12 months, has lack of transportation kept you from medical appointments, getting your medicines, non-medical meetings or appointments, work, or from getting things that you need?: No    Physical Activity: Sufficiently Active (2024)    Exercise Vital Sign      Days of Exercise per Week: 5 days      Minutes of Exercise per Session: 30 min   Stress: Stress Concern Present (2024)    [employer] - Occupational Stress Questionnaire      Feeling of Stress : To some extent   Social Connections: Unknown (2024)    Social Connection and Isolation Panel [NHANES]      Frequency of Communication with Friends and Family: Not on file      Frequency of Social Gatherings with Friends and Family: Once a week      Attends Spiritism Services: Not on file      Active Member of Clubs or Organizations: Not on file      Attends Club or Organization Meetings: Not on file      Marital Status: Not on file   Interpersonal Safety: Not on file   Housing Stability: Low Risk  (2024)    Housing Stability      Do you have housing? : Yes      Are you worried about losing your housing?: No     Family History   Problem Relation Age of Onset     Hypertension Mother      Cerebrovascular Disease Mother      Heart Disease Mother          of MI age 78     Heart Failure Father      Unknown/Adopted Father         Congestive heart failure     Unknown/Adopted Maternal Grandmother      Circulatory Maternal Grandfather         hrafening of arteries/leg amputated     Unknown/Adopted Paternal Grandmother      Unknown/Adopted Paternal Grandfather      Obesity Brother      Cerebrovascular Disease Maternal Aunt      Cerebrovascular Disease Maternal Aunt      Breast Cancer Maternal Aunt      ROS    Allergies   Allergen Reactions     Dust Mites      Nkda [No Known Drug Allergy]      Current Outpatient Medications   Medication Sig Dispense Refill     calcium carbonate-vitamin D 600-200 MG-UNIT TABS Take 1 tablet by mouth daily 30 tablet 11     cetirizine (ZYRTEC) 10 MG tablet Take 1 tablet (10 mg) by mouth daily 90 tablet 11     cyclobenzaprine (FLEXERIL) 10 MG tablet TAKE 1 TABLET(10 MG) BY MOUTH  "THREE TIMES DAILY AS NEEDED FOR MUSCLE SPASMS 30 tablet 5     denosumab (PROLIA) 60 MG/ML SOSY injection Inject 60 mg Subcutaneous once       diclofenac (VOLTAREN) 1 % topical gel Apply 2 g topically 4 times daily. 150 g 1     fluticasone (FLONASE) 50 MCG/ACT nasal spray SHAKE LIQUID AND USE 2 SPRAYS IN EACH NOSTRIL DAILY 16 g 11     LORazepam (ATIVAN) 0.5 MG tablet Take 0.5-1 tablets (0.25-0.5 mg) by mouth every 8 hours as needed for anxiety Take 30 minutes prior to departure.  Do not operate a vehicle after taking this medication 3 tablet 0     meloxicam (MOBIC) 15 MG tablet TAKE 1 TABLET BY MOUTH DAILY AS NEEDED 90 tablet 3     valACYclovir (VALTREX) 1000 mg tablet Take 2 tabs twice daily at onset of cold sore as needed. 20 tablet 6     zolpidem (AMBIEN) 5 MG tablet Take 1 tablet (5 mg) by mouth nightly as needed for sleep. 30 tablet 5     No current facility-administered medications for this visit.       BP (!) 171/114   Pulse 77   Resp 12   Ht 1.638 m (5' 4.5\")   Wt 46.3 kg (102 lb)   SpO2 95%   BMI 17.24 kg/m    GENERAL: healthy, alert and no distress  EYES: Eyes grossly normal to inspection, conjunctivae and sclerae normal  HENT: normal cephalic/atraumatic.  External ears, nose and mouth without ulcers or lesions.  RESP: no audible wheeze, cough, or visible cyanosis.  No visible retractions or increased work of breathing.  Able to speak fully in complete sentences.  NEURO: Cranial nerves grossly intact, mentation intact and speech normal  PSYCH: mentation appears normal, affect normal/bright, judgement and insight intact, normal speech and appearance well-groomed    PVR 0 mL by bladder scan    CC  Patient Care Team:  Alexandru Harden MD as PCP - General (Internal Medicine)  Alexandru Harden MD as Assigned PCP  Darlene Gonzales MD as MD (Urology)  SELF, REFERRED                Again, thank you for allowing me to participate in the care of your patient.      Sincerely,    Darlene Gonzales MD    "

## 2024-09-20 NOTE — PROGRESS NOTES
September 20, 2024    Referring Provider: Referred Self, MD  No address on file    Primary Care Provider: Alexandru Harden    Assessment & Plan     Urinary incontinence without sensory awareness  New onset but given that the urine leakage is small volume and not consistent I have recommended she start with pelvic floor physical therapy.  We discussed how this works and she is agreeable  - POST-VOID RESIDUAL BLADDER SCAN  - Physical Therapy  Referral; Future    RTC 6 months to see Berenice in [place], sooner if needed    10 minutes were spent on this day of the encounter in reviewing the EMR including, direct patient care, coordination of care and documentation    Darlene Gonzales MD MPH  (she/her/hers)   of Urology  [facility]      HPI:  Gabriela Feliciano is a 64 year old female who presents for evaluation of her pelvic floor symptoms.  She states that back in June that she notices a little leakage, very small volumes, every morning but not noticing rest of day.   Now wears a liner, but notes usually dry    Denies gross hematuria, vaginal bleeding, UTI, vaginal bulge, constipation, dyspareunia.    Denies pelvic surgery    Past Medical History:   Diagnosis Date    Arthritis     Congestive heart failure (H) My father, not me    Diagnostic skin and sensitization tests 3/94 skin tests pos. for DM only--all other environemental allergens NEGATIVE    Dyslipidemia     GERD (gastroesophageal reflux disease)     Heart disease     House dust mite allergy 3/94 skin tests    only DM    Post menopausal problems     TMJ disorder     Vitamin D deficiency      Past Surgical History:   Procedure Laterality Date    NO HISTORY OF SURGERY       Social History     Socioeconomic History    Marital status:      Spouse name: Not on file    Number of children: Not on file    Years of education: Not on file    Highest education level: Not on file   Occupational History    Not on file   Tobacco Use     Smoking status: Never    Smokeless tobacco: Never   Vaping Use    Vaping status: Never Used   Substance and Sexual Activity    Alcohol use: Yes     Comment: glass of wine 1-3 times a week    Drug use: No    Sexual activity: Yes     Partners: Male     Birth control/protection: Post-menopausal, None     Comment: not an issue at my age   Other Topics Concern    Parent/sibling w/ CABG, MI or angioplasty before 65F 55M? No     Service No    Blood Transfusions No    Caffeine Concern No    Occupational Exposure No    Hobby Hazards No    Sleep Concern No    Stress Concern No    Weight Concern No    Special Diet No    Back Care No    Exercise Yes    Bike Helmet Yes    Seat Belt Yes    Self-Exams Yes   Social History Narrative    Not on file     Social Determinants of Health     Financial Resource Strain: Low Risk  (8/31/2024)    Financial Resource Strain     Within the past 12 months, have you or your family members you live with been unable to get utilities (heat, electricity) when it was really needed?: No   Food Insecurity: Low Risk  (8/31/2024)    Food Insecurity     Within the past 12 months, did you worry that your food would run out before you got money to buy more?: No     Within the past 12 months, did the food you bought just not last and you didn t have money to get more?: No   Transportation Needs: Low Risk  (8/31/2024)    Transportation Needs     Within the past 12 months, has lack of transportation kept you from medical appointments, getting your medicines, non-medical meetings or appointments, work, or from getting things that you need?: No   Physical Activity: Sufficiently Active (8/31/2024)    Exercise Vital Sign     Days of Exercise per Week: 5 days     Minutes of Exercise per Session: 30 min   Stress: Stress Concern Present (8/31/2024)    [employer] [place] [employer] - Occupational Stress Questionnaire     Feeling of Stress : To some extent   Social Connections: Unknown (8/31/2024)     Social Connection and Isolation Panel [NHANES]     Frequency of Communication with Friends and Family: Not on file     Frequency of Social Gatherings with Friends and Family: Once a week     Attends [employer] Services: Not on file     Active Member of Clubs or Organizations: Not on file     Attends Club or Organization Meetings: Not on file     Marital Status: Not on file   Interpersonal Safety: Not on file   Housing Stability: Low Risk  (2024)    Housing Stability     Do you have housing? : Yes     Are you worried about losing your housing?: No     Family History   Problem Relation Age of Onset    Hypertension Mother     Cerebrovascular Disease Mother     Heart Disease Mother          of MI age 78    Heart Failure Father     Unknown/Adopted Father         Congestive heart failure    Unknown/Adopted Maternal Grandmother     Circulatory Maternal Grandfather         hrafening of arteries/leg amputated    Unknown/Adopted Paternal Grandmother     Unknown/Adopted Paternal Grandfather     Obesity Brother     Cerebrovascular Disease Maternal Aunt     Cerebrovascular Disease Maternal Aunt     Breast Cancer Maternal Aunt      ROS    Allergies   Allergen Reactions    Dust Mites     Nkda [No Known Drug Allergy]      Current Outpatient Medications   Medication Sig Dispense Refill    calcium carbonate-vitamin D 600-200 MG-UNIT TABS Take 1 tablet by mouth daily 30 tablet 11    cetirizine (ZYRTEC) 10 MG tablet Take 1 tablet (10 mg) by mouth daily 90 tablet 11    cyclobenzaprine (FLEXERIL) 10 MG tablet TAKE 1 TABLET(10 MG) BY MOUTH THREE TIMES DAILY AS NEEDED FOR MUSCLE SPASMS 30 tablet 5    denosumab (PROLIA) 60 MG/ML SOSY injection Inject 60 mg Subcutaneous once      diclofenac (VOLTAREN) 1 % topical gel Apply 2 g topically 4 times daily. 150 g 1    fluticasone (FLONASE) 50 MCG/ACT nasal spray SHAKE LIQUID AND USE 2 SPRAYS IN EACH NOSTRIL DAILY 16 g 11    LORazepam (ATIVAN) 0.5 MG tablet Take 0.5-1 tablets (0.25-0.5 mg)  "by mouth every 8 hours as needed for anxiety Take 30 minutes prior to departure.  Do not operate a vehicle after taking this medication 3 tablet 0    meloxicam (MOBIC) 15 MG tablet TAKE 1 TABLET BY MOUTH DAILY AS NEEDED 90 tablet 3    valACYclovir (VALTREX) 1000 mg tablet Take 2 tabs twice daily at onset of cold sore as needed. 20 tablet 6    zolpidem (AMBIEN) 5 MG tablet Take 1 tablet (5 mg) by mouth nightly as needed for sleep. 30 tablet 5     No current facility-administered medications for this visit.       BP (!) 171/114   Pulse 77   Resp 12   Ht 1.638 m (5' 4.5\")   Wt 46.3 kg (102 lb)   SpO2 95%   BMI 17.24 kg/m    GENERAL: healthy, alert and no distress  EYES: Eyes grossly normal to inspection, conjunctivae and sclerae normal  HENT: normal cephalic/atraumatic.  External ears, nose and mouth without ulcers or lesions.  RESP: no audible wheeze, cough, or visible cyanosis.  No visible retractions or increased work of breathing.  Able to speak fully in complete sentences.  NEURO: Cranial nerves grossly intact, mentation intact and speech normal  PSYCH: mentation appears normal, affect normal/bright, judgement and insight intact, normal speech and appearance well-groomed    PVR 0 mL by bladder scan    CC  Patient Care Team:  Alexandru Harden MD as PCP - General (Internal Medicine)  Alexandru Harden MD as Assigned PCP  Darlene Gonzales MD as MD (Urology)  SELF, REFERRED              "

## 2024-11-07 ENCOUNTER — THERAPY VISIT (OUTPATIENT)
Dept: PHYSICAL THERAPY | Facility: CLINIC | Age: 64
End: 2024-11-07
Attending: UROLOGY
Payer: COMMERCIAL

## 2024-11-07 DIAGNOSIS — N39.42 URINARY INCONTINENCE WITHOUT SENSORY AWARENESS: ICD-10-CM

## 2024-11-07 PROCEDURE — 97110 THERAPEUTIC EXERCISES: CPT | Mod: GP | Performed by: PHYSICAL THERAPIST

## 2024-11-07 PROCEDURE — 97161 PT EVAL LOW COMPLEX 20 MIN: CPT | Mod: GP | Performed by: PHYSICAL THERAPIST

## 2024-11-07 NOTE — PROGRESS NOTES
PHYSICAL THERAPY EVALUATION  Type of Visit: Evaluation              Subjective      Condition type:  Recurrent (multiple episodes of < 3 months)  Cause of current episode:  Unspecified     Nature of treatment:  Rehabilitative  Functional ability:  No functional limitations  Documented POC (choose all that apply):  Measurable short and long term/discharge treatment goals related to physical and functional deficits.;Frequency of treatment visits and treatment activities to address deficit areas.;Patient agrees to program participation including home program  Briefly describe symptoms:  pt has loss of urine during the day, unsure what exactly causes it. typically occurs in the morning. pt also experiences night time urinary frequency of 4x per night  How did the symptoms start:  insideous onset  Average pain/intensity last 24 hours:  0/10  Average pain/intensity past week:  0/10  Frequency of Symptoms:  Intermittently (0-25% of the time)  Symptom impact on ADLs:  A little bit  Condition change since eval:  N/A (first visit)  General health reported by patient:  Excellent      Presenting condition or subjective complaint: (Patient-Rptd) Referred by urologist  Date of onset: 09/20/24    Relevant medical history:     Dates & types of surgery:      Prior diagnostic imaging/testing results:       Prior therapy history for the same diagnosis, illness or injury: (Patient-Rptd) No      Living Environment  Social support: (Patient-Rptd) With a significant other or spouse   Type of home: (Patient-Rptd) House   Stairs to enter the home: (Patient-Rptd) Yes (Patient-Rptd) 3 Is there a railing: (Patient-Rptd) No     Ramp: (Patient-Rptd) Yes   Stairs inside the home: (Patient-Rptd) Yes (Patient-Rptd) 16 Is there a railing: (Patient-Rptd) Yes     Help at home: (Patient-Rptd) None  Equipment owned:       Employment: (Patient-Rptd) No    Hobbies/Interests: (Patient-Rptd) Birding, walking the dog    Patient goals for therapy:  (Patient-Rptd) Stop very minor leaking    Pain assessment:      Objective      PELVIC EVALUATION  ADDITIONAL HISTORY:  Sex assigned at birth: (Patient-Rptd) Female  Gender identity: (Patient-Rptd) Female    Pronouns: (Patient-Rptd) She/Her Hers      Bladder History:  Feels bladder filling: (Patient-Rptd) No  Triggers for feeling of inability to wait to go to the bathroom: (Patient-Rptd) No    How long can you wait to urinate: (Patient-Rptd) As long as necessary  Gets up at night to urinate: (Patient-Rptd) Yes (Patient-Rptd) 4  Can stop the flow of urine when urinating: (Patient-Rptd) Yes  Volume of urine usually released: (Patient-Rptd) Medium   Other issues: (Patient-Rptd) Dribbling after urinating  Number of bladder infections in last 12 months:    Fluid intake per day: (Patient-Rptd) 30 ounces approximately (Patient-Rptd) 15 ounces approximately    Medications taken for bladder: (Patient-Rptd) No     Activities causing urine leak:    pt unsure what triggers the urinating.   Amount of urine typically leaked: (Patient-Rptd) Very small amount, usually in morning  Pads used to help with leaking: (Patient-Rptd) Yes I use this many pads per day: (Patient-Rptd) 1, but no evidence it s necessary   I use this type/brand: (Patient-Rptd) Panty liner      Bowel History:  Frequency of bowel movement: (Patient-Rptd) Daily  Consistency of stool: (Patient-Rptd) Soft-formed    Ignores the urge to defecate: (Patient-Rptd) No  Other bowel issues:    Length of time spent trying to have a bowel movement:      Sexual Function History:  Sexual orientation: (Patient-Rptd) Straight    Sexually active:    Lubrication used: (Patient-Rptd) No (Patient-Rptd) No  Pelvic pain:      Pain or difficulty with orgasms/erection/ejaculation:      State of menopause: (Patient-Rptd) Post-menopause (I am done with menopause)  Hormone medications: (Patient-Rptd) No      Are you currently pregnant: (Patient-Rptd) No  Number of previous pregnancies:  (Patient-Rptd) 3  Number of deliveries: (Patient-Rptd) 3  If you have delivered before, did you have any of these issues during delivery: (Patient-Rptd) Tearing; Episiotomy; Vaginal delivery  Have you been diagnosed with pelvic prolapse or abdominal separation: (Patient-Rptd) No  Do you get regular exercise: (Patient-Rptd) Yes  Have you tried pelvic floor strengthening exercises for 4 weeks: (Patient-Rptd) No  Do you have any history of trauma that is relevant to your care that you d like to share: (Patient-Rptd) No      Discussed reason for referral regarding pelvic health needs and external/internal pelvic floor muscle examination with patient/guardian.  Opportunity provided to ask questions and verbal consent for assessment and intervention was given.    PAIN: pt reports no pain  POSTURE: normal seated and standing posture, normal gait observed with no assistive device.  LUMBAR SCREEN: full AROM flexion and extension.  HIP SCREEN:  Strength: hip extension 4/5 B, glute max 3+/5 B  Functional Strength Testing: bridge with incomplete lifting, nearly full ROM observed.    PELVIC EXAM  ABDOMINAL ASSESSMENT    Abdominal Activation/Strength: Stanton lowering test (measured in degrees): 3/5 B Lower Extremity lowering test, 4/5 sit up test.      Assessment & Plan   CLINICAL IMPRESSIONS  Medical Diagnosis: Urinary Incontinence    Treatment Diagnosis: Urinary Incontinence   Impression/Assessment: Patient is a 64 year old female with Pelvic Floor weakness, urinary incontinence complaints.  The following significant findings have been identified: Decreased strength, Impaired muscle performance, Decreased activity tolerance, and Impaired posture. These impairments interfere with their ability to perform self care tasks, recreational activities, household chores, household mobility, and community mobility as compared to previous level of function.     Clinical Decision Making (Complexity):  Clinical Presentation:  Stable/Uncomplicated  Clinical Presentation Rationale: based on medical and personal factors listed in PT evaluation  Clinical Decision Making (Complexity): Low complexity    PLAN OF CARE  Treatment Interventions:  Interventions: Neuromuscular Re-education, Therapeutic Activity, Therapeutic Exercise    Long Term Goals     PT Goal 1  Goal Identifier: urinary Incontinence  Goal Description: pt will be able to have no loss of urine with any seated position throughout the day  Rationale: to maximize safety and independence with performance of ADLs and functional tasks  Target Date: 01/30/25      Frequency of Treatment: 1x every other week  Duration of Treatment: 12 weeks    Recommended Referrals to Other Professionals:   Education Assessment:        Risks and benefits of evaluation/treatment have been explained.   Patient/Family/caregiver agrees with Plan of Care.     Evaluation Time:             Signing Clinician: Radha Ziegler PT        [facility]                                                                                   OUTPATIENT PHYSICAL THERAPY      PLAN OF TREATMENT FOR OUTPATIENT REHABILITATION   Patient's Last Name, First Name, CRISPINELENA FelicianoGabriela  A YOB: 1960   Provider's Name   [facility]   Medical Record No.  [id number]     Onset Date: 09/20/24  Start of Care Date: 11/07/24     Medical Diagnosis:  Urinary Incontinence      PT Treatment Diagnosis:  Urinary Incontinence Plan of Treatment  Frequency/Duration: 1x every other week/ 12 weeks    Certification date from 11/07/24 to 01/30/25         See note for plan of treatment details and functional goals     Radha Ziegler, PT                         I CERTIFY THE NEED FOR THESE SERVICES FURNISHED UNDER        THIS PLAN OF TREATMENT AND WHILE UNDER MY CARE     (Physician attestation of this document indicates review and certification of the therapy plan).               Referring Provider:  Darlene Gonzales    Initial Assessment  See Epic Evaluation- Start of Care Date: 11/07/24

## 2025-04-19 ENCOUNTER — HEALTH MAINTENANCE LETTER (OUTPATIENT)
Age: 65
End: 2025-04-19

## 2025-06-16 ENCOUNTER — VIRTUAL VISIT (OUTPATIENT)
Dept: FAMILY MEDICINE | Facility: CLINIC | Age: 65
End: 2025-06-16
Payer: COMMERCIAL

## 2025-06-16 DIAGNOSIS — M80.00XD AGE-RELATED OSTEOPOROSIS WITH CURRENT PATHOLOGICAL FRACTURE WITH ROUTINE HEALING, SUBSEQUENT ENCOUNTER: Primary | ICD-10-CM

## 2025-06-16 DIAGNOSIS — F51.01 PRIMARY INSOMNIA: ICD-10-CM

## 2025-06-16 PROCEDURE — 98006 SYNCH AUDIO-VIDEO EST MOD 30: CPT | Performed by: INTERNAL MEDICINE

## 2025-06-16 RX ORDER — ALBUTEROL SULFATE 0.83 MG/ML
2.5 SOLUTION RESPIRATORY (INHALATION)
OUTPATIENT
Start: 2025-08-12

## 2025-06-16 RX ORDER — ALBUTEROL SULFATE 90 UG/1
1-2 INHALANT RESPIRATORY (INHALATION)
Start: 2025-08-12

## 2025-06-16 RX ORDER — EPINEPHRINE 1 MG/ML
0.3 INJECTION, SOLUTION, CONCENTRATE INTRAVENOUS EVERY 5 MIN PRN
OUTPATIENT
Start: 2025-08-12

## 2025-06-16 RX ORDER — MEPERIDINE HYDROCHLORIDE 25 MG/ML
25 INJECTION INTRAMUSCULAR; INTRAVENOUS; SUBCUTANEOUS
OUTPATIENT
Start: 2025-08-12

## 2025-06-16 RX ORDER — DIPHENHYDRAMINE HYDROCHLORIDE 50 MG/ML
50 INJECTION, SOLUTION INTRAMUSCULAR; INTRAVENOUS
Start: 2025-08-12

## 2025-06-16 RX ORDER — DIPHENHYDRAMINE HYDROCHLORIDE 50 MG/ML
25 INJECTION, SOLUTION INTRAMUSCULAR; INTRAVENOUS
Start: 2025-08-12

## 2025-06-16 RX ORDER — METHYLPREDNISOLONE SODIUM SUCCINATE 40 MG/ML
40 INJECTION INTRAMUSCULAR; INTRAVENOUS
Start: 2025-08-12

## 2025-06-16 NOTE — PROGRESS NOTES
"  If patient has telephone visit, have they been educated on video visit as preferred visit method and offered to change to video visit? NOT APPLICABLE        Instructions Relayed to Patient by Virtual Roomer:     Patient is active on [employer]:   Relayed following to patient: \"It looks like you are active on [employer], are you able to join the visit this way? If not, do you need us to send you a link now or would you like your provider to send a link via text or email when they are ready to initiate the visit?\"      Patient Confirmed they will join visit via: Tugg  Reminded patient to ensure they were logged on to virtual visit by arrival time listed.   Asked if patient has flexibility to initiate visit sooner than arrival time: patient is unable to initiate visit earlier than arrival time     If pediatric virtual visit, ensured pediatric patient along with parent/guardian will be present for video visit.     Patient offered the website [email or web address] and/or phone number to [employer] Help line: [phone]      Gabriela is a 65 year old who is being evaluated via a billable video visit.    How would you like to obtain your AVS? Tugg  If the video visit is dropped, the invitation should be resent by: Text to cell phone: [phone]  Will anyone else be joining your video visit? No      Assessment & Plan     Age-related osteoporosis with current pathological fracture with routine healing, subsequent encounter  She has a longstanding history of osteoporosis  She was getting Prolia injections from a provider who is a sports medicine specialist at one of the health systems in the [place]  Apparently he left now the practice and there is no one else there to prescribe the Prolia  I have reviewed the DEXA scans including the most recent DEXA scan and it does show that her bone density is increasing on Prolia  She  thinks that she has been on Prolia at least for the last 4 years  She is due for " her next Prolia injection in August and wants to get an order for the same as she is no longer able to see this provider who was providing her with Prolia  I explained to her that typically Prolia should not be used for more than 4 years and we need to get endocrinology consultation for further guidance about this whether she has to come off Prolia and go on antiresorptive agents like Fosamax or zoledronic acid  Of note she had a fall in November and fractured her patella  For now I will prescribe 1 more dose of Prolia but further doses will have to come from endocrinology  - Adult Endocrinology  Referral; Future    Primary insomnia  She is on Ambien                Raysa Ochoa is a 65 year old, presenting for the following health issues:  No chief complaint on file.        6/16/2025     2:24 PM   Additional Questions   Roomed by Livia   Accompanied by self       Video Start Time: 230 PM    History of Present Illness       Reason for visit:  Request order for Prolia shot, treatment for osteoporosis.  Doctor who administered it in the past is gone.    She eats 2-3 servings of fruits and vegetables daily.She consumes 1 sweetened beverage(s) daily.She exercises with enough effort to increase her heart rate 30 to 60 minutes per day.  She exercises with enough effort to increase her heart rate 7 days per week.   She is taking medications regularly.                Review of Systems  Constitutional, HEENT, cardiovascular, pulmonary, gi and gu systems are negative, except as otherwise noted.      Objective           Vitals:  No vitals were obtained today due to virtual visit.    Physical Exam   GENERAL: alert and no distress  EYES: Eyes grossly normal to inspection.  No discharge or erythema, or obvious scleral/conjunctival abnormalities.  RESP: No audible wheeze, cough, or visible cyanosis.    SKIN: Visible skin clear. No significant rash, abnormal pigmentation or lesions.  NEURO: Cranial nerves grossly  intact.  Mentation and speech appropriate for age.  PSYCH: Appropriate affect, tone, and pace of words          Video-Visit Details    Type of service:  Video Visit   Video End Time:245 PM  Originating Location (pt. Location): Home    Distant Location (provider location):  Off-site  Platform used for Video Visit: Marie  Signed Electronically by: Alexandru Harden MD

## 2025-06-17 ENCOUNTER — PATIENT OUTREACH (OUTPATIENT)
Dept: CARE COORDINATION | Facility: CLINIC | Age: 65
End: 2025-06-17
Payer: COMMERCIAL

## 2025-06-19 ENCOUNTER — PATIENT OUTREACH (OUTPATIENT)
Dept: CARE COORDINATION | Facility: CLINIC | Age: 65
End: 2025-06-19
Payer: COMMERCIAL

## 2025-07-14 ENCOUNTER — MYC MEDICAL ADVICE (OUTPATIENT)
Dept: FAMILY MEDICINE | Facility: CLINIC | Age: 65
End: 2025-07-14
Payer: COMMERCIAL

## 2025-08-13 ENCOUNTER — TRANSFERRED RECORDS (OUTPATIENT)
Dept: HEALTH INFORMATION MANAGEMENT | Facility: CLINIC | Age: 65
End: 2025-08-13
Payer: COMMERCIAL

## 2025-08-13 ENCOUNTER — PATIENT OUTREACH (OUTPATIENT)
Dept: CARE COORDINATION | Facility: CLINIC | Age: 65
End: 2025-08-13
Payer: COMMERCIAL

## 2025-08-14 DIAGNOSIS — Z12.11 COLON CANCER SCREENING: ICD-10-CM

## 2025-08-19 ENCOUNTER — TELEPHONE (OUTPATIENT)
Dept: FAMILY MEDICINE | Facility: CLINIC | Age: 65
End: 2025-08-19
Payer: COMMERCIAL

## 2025-08-19 DIAGNOSIS — Z13.220 SCREENING FOR HYPERLIPIDEMIA: Primary | ICD-10-CM

## 2025-08-19 DIAGNOSIS — Z13.1 SCREENING FOR DIABETES MELLITUS: ICD-10-CM

## 2025-08-19 DIAGNOSIS — Z00.00 ROUTINE GENERAL MEDICAL EXAMINATION AT A HEALTH CARE FACILITY: ICD-10-CM

## 2025-08-28 ENCOUNTER — ORDERS ONLY (AUTO-RELEASED) (OUTPATIENT)
Dept: URGENT CARE | Facility: CLINIC | Age: 65
End: 2025-08-28
Payer: COMMERCIAL

## 2025-08-28 DIAGNOSIS — Z12.11 COLON CANCER SCREENING: ICD-10-CM

## 2025-09-03 ENCOUNTER — LAB (OUTPATIENT)
Dept: LAB | Facility: CLINIC | Age: 65
End: 2025-09-03
Payer: COMMERCIAL

## 2025-09-03 DIAGNOSIS — Z13.220 SCREENING FOR HYPERLIPIDEMIA: ICD-10-CM

## 2025-09-03 DIAGNOSIS — Z00.00 ROUTINE GENERAL MEDICAL EXAMINATION AT A HEALTH CARE FACILITY: ICD-10-CM

## 2025-09-03 DIAGNOSIS — Z13.1 SCREENING FOR DIABETES MELLITUS: ICD-10-CM

## 2025-09-03 LAB
ANION GAP SERPL CALCULATED.3IONS-SCNC: 12 MMOL/L (ref 7–15)
BASOPHILS # BLD AUTO: 0.04 10E3/UL (ref 0–0.2)
BASOPHILS NFR BLD AUTO: 0.6 %
BUN SERPL-MCNC: 13.3 MG/DL (ref 8–23)
CALCIUM SERPL-MCNC: 9.5 MG/DL (ref 8.8–10.4)
CHLORIDE SERPL-SCNC: 97 MMOL/L (ref 98–107)
CHOLEST SERPL-MCNC: 245 MG/DL
CREAT SERPL-MCNC: 0.69 MG/DL (ref 0.51–0.95)
EGFRCR SERPLBLD CKD-EPI 2021: >90 ML/MIN/1.73M2
EOSINOPHIL # BLD AUTO: 0.25 10E3/UL (ref 0–0.7)
EOSINOPHIL NFR BLD AUTO: 3.5 %
ERYTHROCYTE [DISTWIDTH] IN BLOOD BY AUTOMATED COUNT: 12.4 % (ref 10–15)
EST. AVERAGE GLUCOSE BLD GHB EST-MCNC: 108 MG/DL
FASTING STATUS PATIENT QL REPORTED: YES
FASTING STATUS PATIENT QL REPORTED: YES
GLUCOSE SERPL-MCNC: 96 MG/DL (ref 70–99)
HBA1C MFR BLD: 5.4 % (ref 0–5.6)
HCO3 SERPL-SCNC: 27 MMOL/L (ref 22–29)
HCT VFR BLD AUTO: 38.7 % (ref 35–47)
HDLC SERPL-MCNC: 80 MG/DL
HGB BLD-MCNC: 13 G/DL (ref 11.7–15.7)
IMM GRANULOCYTES # BLD: <0.04 10E3/UL
IMM GRANULOCYTES NFR BLD: 0.4 %
LDLC SERPL CALC-MCNC: 150 MG/DL
LYMPHOCYTES # BLD AUTO: 2.01 10E3/UL (ref 0.8–5.3)
LYMPHOCYTES NFR BLD AUTO: 28.4 %
MCH RBC QN AUTO: 31.9 PG (ref 26.5–33)
MCHC RBC AUTO-ENTMCNC: 33.6 G/DL (ref 31.5–36.5)
MCV RBC AUTO: 95.1 FL (ref 78–100)
MONOCYTES # BLD AUTO: 0.59 10E3/UL (ref 0–1.3)
MONOCYTES NFR BLD AUTO: 8.3 %
NEUTROPHILS # BLD AUTO: 4.15 10E3/UL (ref 1.6–8.3)
NEUTROPHILS NFR BLD AUTO: 58.8 %
NONHDLC SERPL-MCNC: 165 MG/DL
PLATELET # BLD AUTO: 314 10E3/UL (ref 150–450)
POTASSIUM SERPL-SCNC: 3.8 MMOL/L (ref 3.4–5.3)
RBC # BLD AUTO: 4.07 10E6/UL (ref 3.8–5.2)
SODIUM SERPL-SCNC: 136 MMOL/L (ref 135–145)
TRIGL SERPL-MCNC: 73 MG/DL
WBC # BLD AUTO: 7.07 10E3/UL (ref 4–11)

## 2025-09-03 PROCEDURE — 83036 HEMOGLOBIN GLYCOSYLATED A1C: CPT

## 2025-09-03 PROCEDURE — 85025 COMPLETE CBC W/AUTO DIFF WBC: CPT | Mod: GZ

## 2025-09-03 PROCEDURE — 80061 LIPID PANEL: CPT | Mod: GZ

## 2025-09-03 PROCEDURE — 36415 COLL VENOUS BLD VENIPUNCTURE: CPT

## 2025-09-03 PROCEDURE — 80048 BASIC METABOLIC PNL TOTAL CA: CPT
